# Patient Record
Sex: MALE | Race: WHITE | NOT HISPANIC OR LATINO | Employment: OTHER | URBAN - METROPOLITAN AREA
[De-identification: names, ages, dates, MRNs, and addresses within clinical notes are randomized per-mention and may not be internally consistent; named-entity substitution may affect disease eponyms.]

---

## 2019-07-05 ENCOUNTER — OFFICE VISIT (OUTPATIENT)
Dept: OBGYN CLINIC | Facility: CLINIC | Age: 64
End: 2019-07-05
Payer: COMMERCIAL

## 2019-07-05 VITALS
HEART RATE: 111 BPM | HEIGHT: 67 IN | WEIGHT: 202 LBS | DIASTOLIC BLOOD PRESSURE: 73 MMHG | SYSTOLIC BLOOD PRESSURE: 109 MMHG | BODY MASS INDEX: 31.71 KG/M2

## 2019-07-05 DIAGNOSIS — M65.331 TRIGGER MIDDLE FINGER OF RIGHT HAND: Primary | ICD-10-CM

## 2019-07-05 PROCEDURE — 99203 OFFICE O/P NEW LOW 30 MIN: CPT | Performed by: ORTHOPAEDIC SURGERY

## 2019-07-05 RX ORDER — CEFAZOLIN SODIUM 2 G/50ML
2000 SOLUTION INTRAVENOUS ONCE
Status: CANCELLED | OUTPATIENT
Start: 2019-07-31 | End: 2019-07-05

## 2019-07-05 NOTE — H&P (VIEW-ONLY)
Assessment/Plan:  1  Trigger middle finger of right hand         Scribe Attestation    I,:   Kat Gutierrez MA am acting as a scribe while in the presence of the attending physician :        I,:   Dahlia Bose DO personally performed the services described in this documentation    as scribed in my presence :              I discussed with Sveta Olivo today that his signs and symptoms are consistent with right long finger trigger finger  He is tender to palpation over the A1 pulley  There is obvious trigger on exam  He does have a mild flexion contracture of the PIP of approximately 15 degrees  Surgical intervention in the form of right long finger trigger finger release was discussed  He was agreeable to this  Risk of the surgery are inclusive of but not limited to bleeding, infection, nerve injury, blood clot, worsening of symptoms, not achieving the anticipated results, persistent stiffness, weakness and the need for additional surgery  Sveta Olivo verbally stated he understood those risks and would like to proceed with the surgery  Surgical consent was signed in the office today  I will see him the day of surgery  He understands he may need physical therapy after surgery because of his PIP flexion contracture  Subjective:   Ashok Barrientos is a 59 y o  male who presents to the office today for evaluation of right long finger triggering  Patient states this has been ongoing for several months  He states it does lock down and he will have to use his opposite hand to unlock it  He notes pain to the area of the A1 pulley  Patient does have a history of right ring finger trigger release performed in the past  He denies any numbness or tingling  Review of Systems   Constitutional: Negative for chills and fever  HENT: Negative for drooling and sneezing  Eyes: Negative for redness  Respiratory: Negative for cough and wheezing  Gastrointestinal: Negative for nausea and vomiting     Musculoskeletal: Negative for arthralgias, joint swelling and myalgias  Neurological: Negative for weakness and numbness  Psychiatric/Behavioral: Negative for behavioral problems  The patient is not nervous/anxious  History reviewed  No pertinent past medical history  Past Surgical History:   Procedure Laterality Date    TRIGGER FINGER RELEASE Right     Ring Finger        Family History   Problem Relation Age of Onset    No Known Problems Mother     No Known Problems Father     No Known Problems Sister     No Known Problems Brother     No Known Problems Maternal Aunt     No Known Problems Maternal Uncle     No Known Problems Paternal Aunt     No Known Problems Paternal Uncle     No Known Problems Maternal Grandmother     No Known Problems Maternal Grandfather     No Known Problems Paternal Grandmother     No Known Problems Paternal Grandfather        Social History     Occupational History    Not on file   Tobacco Use    Smoking status: Current Every Day Smoker    Smokeless tobacco: Never Used   Substance and Sexual Activity    Alcohol use: Never     Frequency: Never    Drug use: Never    Sexual activity: Not on file       No current outpatient medications on file  No Known Allergies    Objective:  Vitals:    07/05/19 0908   BP: 109/73   Pulse: (!) 111       Ortho Exam     Right long finger    TTP A1 pulley  Obvious triggering   Mild PIP flex contracture 15 degrees  Full fist   Compartments soft  Brisk capillary refill  Sensation intact median, radial, and ulnar nerve     Physical Exam   Constitutional: He is oriented to person, place, and time  He appears well-developed and well-nourished  HENT:   Head: Normocephalic and atraumatic  Eyes: Conjunctivae are normal  Right eye exhibits no discharge  Left eye exhibits no discharge  Neck: Normal range of motion  Neck supple  Cardiovascular: Normal rate and intact distal pulses  Pulmonary/Chest: Effort normal  No respiratory distress     Musculoskeletal: As noted in HPI   Neurological: He is alert and oriented to person, place, and time  Skin: Skin is warm and dry  Psychiatric: He has a normal mood and affect   His behavior is normal  Judgment and thought content normal

## 2019-07-05 NOTE — PROGRESS NOTES
Assessment/Plan:  1  Trigger middle finger of right hand         Scribe Attestation    I,:   Kat Gutierrez MA am acting as a scribe while in the presence of the attending physician :        I,:   Dago Rene DO personally performed the services described in this documentation    as scribed in my presence :              I discussed with Tessy Simmons today that his signs and symptoms are consistent with right long finger trigger finger  He is tender to palpation over the A1 pulley  There is obvious trigger on exam  He does have a mild flexion contracture of the PIP of approximately 15 degrees  Surgical intervention in the form of right long finger trigger finger release was discussed  He was agreeable to this  Risk of the surgery are inclusive of but not limited to bleeding, infection, nerve injury, blood clot, worsening of symptoms, not achieving the anticipated results, persistent stiffness, weakness and the need for additional surgery  Tessy Simmons verbally stated he understood those risks and would like to proceed with the surgery  Surgical consent was signed in the office today  I will see him the day of surgery  He understands he may need physical therapy after surgery because of his PIP flexion contracture  Subjective:   Jaime Mendez is a 59 y o  male who presents to the office today for evaluation of right long finger triggering  Patient states this has been ongoing for several months  He states it does lock down and he will have to use his opposite hand to unlock it  He notes pain to the area of the A1 pulley  Patient does have a history of right ring finger trigger release performed in the past  He denies any numbness or tingling  Review of Systems   Constitutional: Negative for chills and fever  HENT: Negative for drooling and sneezing  Eyes: Negative for redness  Respiratory: Negative for cough and wheezing  Gastrointestinal: Negative for nausea and vomiting     Musculoskeletal: Negative for arthralgias, joint swelling and myalgias  Neurological: Negative for weakness and numbness  Psychiatric/Behavioral: Negative for behavioral problems  The patient is not nervous/anxious  History reviewed  No pertinent past medical history  Past Surgical History:   Procedure Laterality Date    TRIGGER FINGER RELEASE Right     Ring Finger        Family History   Problem Relation Age of Onset    No Known Problems Mother     No Known Problems Father     No Known Problems Sister     No Known Problems Brother     No Known Problems Maternal Aunt     No Known Problems Maternal Uncle     No Known Problems Paternal Aunt     No Known Problems Paternal Uncle     No Known Problems Maternal Grandmother     No Known Problems Maternal Grandfather     No Known Problems Paternal Grandmother     No Known Problems Paternal Grandfather        Social History     Occupational History    Not on file   Tobacco Use    Smoking status: Current Every Day Smoker    Smokeless tobacco: Never Used   Substance and Sexual Activity    Alcohol use: Never     Frequency: Never    Drug use: Never    Sexual activity: Not on file       No current outpatient medications on file  No Known Allergies    Objective:  Vitals:    07/05/19 0908   BP: 109/73   Pulse: (!) 111       Ortho Exam     Right long finger    TTP A1 pulley  Obvious triggering   Mild PIP flex contracture 15 degrees  Full fist   Compartments soft  Brisk capillary refill  Sensation intact median, radial, and ulnar nerve     Physical Exam   Constitutional: He is oriented to person, place, and time  He appears well-developed and well-nourished  HENT:   Head: Normocephalic and atraumatic  Eyes: Conjunctivae are normal  Right eye exhibits no discharge  Left eye exhibits no discharge  Neck: Normal range of motion  Neck supple  Cardiovascular: Normal rate and intact distal pulses  Pulmonary/Chest: Effort normal  No respiratory distress     Musculoskeletal: As noted in HPI   Neurological: He is alert and oriented to person, place, and time  Skin: Skin is warm and dry  Psychiatric: He has a normal mood and affect   His behavior is normal  Judgment and thought content normal

## 2019-07-08 ENCOUNTER — TELEPHONE (OUTPATIENT)
Dept: FAMILY MEDICINE CLINIC | Facility: CLINIC | Age: 64
End: 2019-07-08

## 2019-07-08 NOTE — TELEPHONE ENCOUNTER
Marissa- surgical coordinator for Dr Vinay Fishman office called to make an appt for pt to get a surgical clearance done for a 7/17/19 surgery  PATs are being done 7/9/19  S poke with pt, he is completely brand new  Has not had a PCP in a long time  He has gone [facility] off in [address]  Phone Number [phone], for more acute issues  Called that office, he has not been seen since 2016   Appt is scheduled for 7/12/19

## 2019-07-09 ENCOUNTER — APPOINTMENT (OUTPATIENT)
Dept: LAB | Facility: CLINIC | Age: 64
End: 2019-07-09
Payer: COMMERCIAL

## 2019-07-09 DIAGNOSIS — M65.331 TRIGGER MIDDLE FINGER OF RIGHT HAND: ICD-10-CM

## 2019-07-09 LAB
ANION GAP SERPL CALCULATED.3IONS-SCNC: 6 MMOL/L (ref 4–13)
APTT PPP: 29 SECONDS (ref 23–37)
BASOPHILS # BLD AUTO: 0.05 THOUSANDS/ΜL (ref 0–0.1)
BASOPHILS NFR BLD AUTO: 1 % (ref 0–1)
BUN SERPL-MCNC: 17 MG/DL (ref 5–25)
CALCIUM SERPL-MCNC: 8.9 MG/DL (ref 8.3–10.1)
CHLORIDE SERPL-SCNC: 107 MMOL/L (ref 100–108)
CO2 SERPL-SCNC: 25 MMOL/L (ref 21–32)
CREAT SERPL-MCNC: 0.91 MG/DL (ref 0.6–1.3)
EOSINOPHIL # BLD AUTO: 0.19 THOUSAND/ΜL (ref 0–0.61)
EOSINOPHIL NFR BLD AUTO: 3 % (ref 0–6)
ERYTHROCYTE [DISTWIDTH] IN BLOOD BY AUTOMATED COUNT: 12.6 % (ref 11.6–15.1)
GFR SERPL CREATININE-BSD FRML MDRD: 90 ML/MIN/1.73SQ M
GLUCOSE SERPL-MCNC: 192 MG/DL (ref 65–140)
HCT VFR BLD AUTO: 45.7 % (ref 36.5–49.3)
HGB BLD-MCNC: 15.3 G/DL (ref 12–17)
IMM GRANULOCYTES # BLD AUTO: 0.03 THOUSAND/UL (ref 0–0.2)
IMM GRANULOCYTES NFR BLD AUTO: 0 % (ref 0–2)
INR PPP: 1.02 (ref 0.84–1.19)
LYMPHOCYTES # BLD AUTO: 1.55 THOUSANDS/ΜL (ref 0.6–4.47)
LYMPHOCYTES NFR BLD AUTO: 23 % (ref 14–44)
MCH RBC QN AUTO: 32.1 PG (ref 26.8–34.3)
MCHC RBC AUTO-ENTMCNC: 33.5 G/DL (ref 31.4–37.4)
MCV RBC AUTO: 96 FL (ref 82–98)
MONOCYTES # BLD AUTO: 0.55 THOUSAND/ΜL (ref 0.17–1.22)
MONOCYTES NFR BLD AUTO: 8 % (ref 4–12)
NEUTROPHILS # BLD AUTO: 4.49 THOUSANDS/ΜL (ref 1.85–7.62)
NEUTS SEG NFR BLD AUTO: 65 % (ref 43–75)
NRBC BLD AUTO-RTO: 0 /100 WBCS
PLATELET # BLD AUTO: 160 THOUSANDS/UL (ref 149–390)
PMV BLD AUTO: 12.9 FL (ref 8.9–12.7)
POTASSIUM SERPL-SCNC: 3.7 MMOL/L (ref 3.5–5.3)
PROTHROMBIN TIME: 13 SECONDS (ref 11.6–14.5)
RBC # BLD AUTO: 4.77 MILLION/UL (ref 3.88–5.62)
SODIUM SERPL-SCNC: 138 MMOL/L (ref 136–145)
WBC # BLD AUTO: 6.86 THOUSAND/UL (ref 4.31–10.16)

## 2019-07-09 PROCEDURE — 36415 COLL VENOUS BLD VENIPUNCTURE: CPT

## 2019-07-09 PROCEDURE — 85025 COMPLETE CBC W/AUTO DIFF WBC: CPT

## 2019-07-09 PROCEDURE — 80048 BASIC METABOLIC PNL TOTAL CA: CPT

## 2019-07-09 PROCEDURE — 85730 THROMBOPLASTIN TIME PARTIAL: CPT

## 2019-07-09 PROCEDURE — 85610 PROTHROMBIN TIME: CPT

## 2019-07-12 ENCOUNTER — OFFICE VISIT (OUTPATIENT)
Dept: FAMILY MEDICINE CLINIC | Facility: CLINIC | Age: 64
End: 2019-07-12
Payer: COMMERCIAL

## 2019-07-12 ENCOUNTER — TELEPHONE (OUTPATIENT)
Dept: FAMILY MEDICINE CLINIC | Facility: CLINIC | Age: 64
End: 2019-07-12

## 2019-07-12 ENCOUNTER — APPOINTMENT (OUTPATIENT)
Dept: PREADMISSION TESTING | Facility: HOSPITAL | Age: 64
End: 2019-07-12
Payer: COMMERCIAL

## 2019-07-12 ENCOUNTER — TELEPHONE (OUTPATIENT)
Dept: OBGYN CLINIC | Facility: CLINIC | Age: 64
End: 2019-07-12

## 2019-07-12 VITALS
HEIGHT: 67 IN | TEMPERATURE: 97.8 F | SYSTOLIC BLOOD PRESSURE: 130 MMHG | BODY MASS INDEX: 32.18 KG/M2 | OXYGEN SATURATION: 94 % | WEIGHT: 205 LBS | RESPIRATION RATE: 16 BRPM | HEART RATE: 72 BPM | DIASTOLIC BLOOD PRESSURE: 94 MMHG

## 2019-07-12 DIAGNOSIS — Z01.818 PRE-OP EVALUATION: Primary | ICD-10-CM

## 2019-07-12 PROCEDURE — 93000 ELECTROCARDIOGRAM COMPLETE: CPT | Performed by: NURSE PRACTITIONER

## 2019-07-12 PROCEDURE — 99204 OFFICE O/P NEW MOD 45 MIN: CPT | Performed by: NURSE PRACTITIONER

## 2019-07-12 RX ORDER — NAPROXEN SODIUM 220 MG
CAPSULE ORAL 2 TIMES DAILY PRN
COMMUNITY

## 2019-07-12 NOTE — TELEPHONE ENCOUNTER
I called Timothy Nunn for him to return my call to reschedule his surgical date  Currently scheduled for 7/17, however, due to cardiac clearance, would need to reschedule after 7/24

## 2019-07-12 NOTE — PRE-PROCEDURE INSTRUCTIONS
My Surgical Experience    The following information was developed to assist you to prepare for your operation  What do I need to do before coming to the hospital?   Arrange for a responsible person to drive you to and from the hospital    Arrange care for your children at home  Children are not allowed in the recovery areas of the hospital   Plan to wear clothing that is easy to put on and take off  If you are having shoulder surgery, wear a shirt that buttons or zippers in the front  Bathing  o Shower the evening before and the morning of your surgery with an antibacterial soap  Please refer to the Pre Op Showering Instructions for Surgery Patients Sheet   o Remove nail polish and all body piercing jewelry  o Do not shave any body part for at least 24 hours before surgery-this includes face, arms, legs and upper body  Food  o Nothing to eat or drink after midnight the night before your surgery  This includes candy and chewing gum  o Exception: If your surgery is after 12:00pm (noon), you may have clear liquids such as 7-Up®, ginger ale, apple or cranberry juice, Jell-O®, water, or clear broth until 8:00 am  o Do not drink milk or juice with pulp on the morning before surgery  o Do not drink alcohol 24 hours before surgery  Medicine  o Follow instructions you received from your surgeon about which medicines you may take on the day of surgery  o If instructed to take medicine on the morning of surgery, take pills with just a small sip of water  Call your prescribing doctor for specific infroamtion on what to do if you take insulin    What should I bring to the hospital?    Bring:  Sadia Drain or a walker, if you have them, for foot or knee surgery   A list of the daily medicines, vitamins, minerals, herbals and nutritional supplements you take   Include the dosages of medicines and the time you take them each day   Glasses, dentures or hearing aids   Minimal clothing; you will be wearing hospital sleepwear   Photo ID; required to verify your identity   If you have a Living Will or Power of , bring a copy of the documents   If you have an ostomy, bring an extra pouch and any supplies you use    Do not bring   Medicines or inhalers   Money, valuables or jewelry    What other information should I know about the day of surgery?  Notify your surgeons if you develop a cold, sore throat, cough, fever, rash or any other illness   Report to the Ambulatory Surgical/Same Day Surgery Unit   You will be instructed to stop at Registration only if you have not been pre-registered   Inform your  fi they do not stay that they will be asked by the staff to leave a phone number where they can be reached   Be available to be reached before surgery  In the event the operating room schedule changes, you may be asked to come in earlier or later than expected    *It is important to tell your doctor and others involved in your health care if you are taking or have been taking any non-prescription drugs, vitamins, minerals, herbals or other nutritional supplements  Any of these may interact with some food or medicines and cause a reaction      Pre-Surgery Instructions:   Medication Instructions    Naproxen Sodium (ALEVE) 220 MG CAPS Instructed patient per Anesthesia Guidelines

## 2019-07-12 NOTE — PROGRESS NOTES
INTERNAL MEDICINE PRE-OPERATIVE EVALUATION  [facility] - [facility]    NAME: Carter Lundberg  AGE: 59 y o  SEX: male  : 1955     DATE: 2019     Internal Medicine Pre-Operative Evaluation:     Chief Complaint: Pre-operative Evaluation     Surgery: Release trigger finger , middle finger, right hand  Anticipated Date of Surgery: 2019  Referring Provider: Self, Referral        History of Present Illness:     Carter Lundberg is a 59 y o  male who presents to the office today for a preoperative consultation at the request of surgeon, Karla William, who plans on performing release trigger finger right hand on 2019  Planned anesthesia is regional  Patient has a bleeding risk of: no recent abnormal bleeding  Patient does not have objections to receiving blood products if needed  Current anti-platelet/anti-coagulation medications that the patient is prescribed includes: None  Assessment of Chronic Conditions:   - None     Assessment of Cardiac Risk:  · Denies unstable or severe angina or MI in the last 6 weeks or history of stent placement in the last year   · Denies decompensated heart failure (e g  New onset heart failure, NYHA functional class IV heart failure, or worsening existing heart failure)  · Denies significant arrhythmias such as high grade AV block, symptomatic ventricular arrhythmia, newly recognized ventricular tachycardia, supraventricular tachycardia with resting heart rate >100, or symptomatic bradycardia  · Denies severe heart valve disease including aortic stenosis or symptomatic mitral stenosis     Exercise Capacity:  · Able to walk 4 blocks without symptoms?: Yes  · Able to walk 2 flights without symptoms?: Yes    Prior Anesthesia Reactions: No     Personal history of venous thromboembolic disease? No    History of steroid use for >2 weeks within last year?  No    STOP-BANG Sleep Apnea Screening Questionnaire:      Do you SNORE loudly (louder than talking or loud enough to be heard through closed doors)? No = 0 point   Do you often feel TIRED, fatigued, or sleepy during daytime? No = 0 point   Has anyone OBSERVED you stop breathing during your sleep? No = 0 point   Do you have or are you being treated for high blood pressure? No = 0 point   BMI more than 35 kg/m2? No = 0 point   AGE over 48years old? Yes = 1 point   NECK circumference > 16 inches (40 cm)? No = 0 point   Male GENDER? Yes = 1 point   TOTAL SCORE 2 = LOW risk of NELA       Review of Systems:     Review of Systems   Constitutional: Negative for fatigue and fever  HENT: Negative for congestion  Eyes: Negative for visual disturbance  Respiratory: Negative for chest tightness and shortness of breath  Cardiovascular: Negative for chest pain, palpitations and leg swelling  Gastrointestinal: Negative for abdominal pain, diarrhea, nausea and vomiting  Endocrine: Negative for cold intolerance, heat intolerance, polydipsia, polyphagia and polyuria  Genitourinary: Negative for dysuria, frequency and urgency  Musculoskeletal:        Pain and locking right middle finger  Skin: Negative for rash and wound  Allergic/Immunologic: Negative for immunocompromised state  Neurological: Negative for dizziness and headaches  Hematological: Does not bruise/bleed easily  All other systems reviewed and are negative  Problem List:     Patient Active Problem List   Diagnosis    Trigger middle finger of right hand        Allergies:     No Known Allergies     Current Medications:     No current outpatient medications on file  Past History:     No past medical history on file       Past Surgical History:   Procedure Laterality Date    TRIGGER FINGER RELEASE Right     Ring Finger         Family History   Problem Relation Age of Onset    No Known Problems Mother     No Known Problems Father     No Known Problems Sister     No Known Problems Brother     No Known Problems Maternal Aunt     No Known Problems Maternal Uncle     No Known Problems Paternal Aunt     No Known Problems Paternal Uncle     No Known Problems Maternal Grandmother     No Known Problems Maternal Grandfather     No Known Problems Paternal Grandmother     No Known Problems Paternal Grandfather         Social History     Socioeconomic History    Marital status:      Spouse name: Not on file    Number of children: Not on file    Years of education: Not on file    Highest education level: Not on file   Occupational History    Not on file   Social Needs    Financial resource strain: Not on file    Food insecurity:     Worry: Not on file     Inability: Not on file    Transportation needs:     Medical: Not on file     Non-medical: Not on file   Tobacco Use    Smoking status: Current Every Day Smoker    Smokeless tobacco: Never Used   Substance and Sexual Activity    Alcohol use: Never     Frequency: Never    Drug use: Never    Sexual activity: Not on file   Lifestyle    Physical activity:     Days per week: Not on file     Minutes per session: Not on file    Stress: Not on file   Relationships    Social connections:     Talks on phone: Not on file     Gets together: Not on file     Attends [employer] service: Not on file     Active member of club or organization: Not on file     Attends meetings of clubs or organizations: Not on file     Relationship status: Not on file    Intimate partner violence:     Fear of current or ex partner: Not on file     Emotionally abused: Not on file     Physically abused: Not on file     Forced sexual activity: Not on file   Other Topics Concern    Not on file   Social History Narrative    Not on file        Physical Exam:      /94 (BP Location: Right arm, Patient Position: Sitting, Cuff Size: Large)   Pulse 72   Temp 97 8 °F (36 6 °C) (Temporal)   Resp 16   Ht 5' 7" (1 702 m)   Wt 93 kg (205 lb)   SpO2 94%   BMI 32 11 kg/m²     Physical Exam Constitutional: He is oriented to person, place, and time  He appears well-developed and well-nourished  No distress  HENT:   Head: Normocephalic and atraumatic  Mouth/Throat: Oropharynx is clear and moist    Eyes: Pupils are equal, round, and reactive to light  EOM are normal    Neck: Normal range of motion  Neck supple  Cardiovascular: Normal rate and regular rhythm  No JVD  No audible carotid bruit  No peripheral edema  EKG NSR , incomplete right bundle branch, occ PVC- no priors to compare   Pulmonary/Chest: Effort normal and breath sounds normal  No respiratory distress  He has no wheezes  He has no rales  Abdominal: Soft  Bowel sounds are normal  He exhibits no distension  There is no tenderness  Musculoskeletal: Normal range of motion  He exhibits no edema, tenderness or deformity  Neurological: He is alert and oriented to person, place, and time  No cranial nerve deficit  Coordination normal    Skin: Skin is warm and dry  No rash noted  He is not diaphoretic  No erythema  Psychiatric: He has a normal mood and affect  His behavior is normal  Judgment and thought content normal    Vitals reviewed  Data:     Pre-operative work-up    Laboratory Results: I have personally reviewed the pertinent laboratory results/reports     EKG: I have personally reviewed pertinent reports  NSR, occ PVC, incomplete right bundle branch block  (pt reports believes EKG was abnormal in past and had "normal stress test", states "many years ago")  Previous cardiopulmonary studies within the past year: None           Assessment:       BMI Counseling: Body mass index is 32 11 kg/m²  Discussed the patient's BMI with him  The BMI is above average  BMI counseling and education was provided to the patient  Nutrition recommendations include reducing portion sizes, decreasing overall calorie intake and moderation in carbohydrate intake  Exercise recommendations include exercising 3-5 times per week         Plan: 59 y o  male with planned surgery: release trigger finger right hand, middle finger        1  Further preoperative workup as follows:   - cardiology eval  Further eval per cardio  2  Medication Management/Recommendations:   - Not applicable, not on any medications    3  Prophylaxis for cardiac events with perioperative beta-blockers: not indicated  4  Patient requires further consultation with: Cardiology: appt scheduled with Dr Albania Perez 7/24/19 at 1:30pm      Clearance  Pt is medically cleared for surgery pending cardiac clearance        [address]  [address] [facility] [employer] [address]  Phone#  [phone]  Fax#  [phone]

## 2019-07-12 NOTE — TELEPHONE ENCOUNTER
----- Message from Tiffanie Jefferson sent at 7/12/2019  2:25 PM EDT -----  Hi, patient needs cardiac clearance for his surgery   He is scheduled to see cardiologist 07/24/19

## 2019-07-12 NOTE — TELEPHONE ENCOUNTER
Left message for pt to cb      Cardiology can see pt on 7/24/19 at 1:30pm  Pt needs to be there at 1:15pm    [address], [facility] [address]  [address]

## 2019-07-15 NOTE — TELEPHONE ENCOUNTER
Carolynne Dancer returned my call, stated that 7/31 for surgery would be fine  He is aware that he requires the cardiac clearance for surgery

## 2019-07-24 ENCOUNTER — OFFICE VISIT (OUTPATIENT)
Dept: CARDIOLOGY CLINIC | Facility: CLINIC | Age: 64
End: 2019-07-24
Payer: COMMERCIAL

## 2019-07-24 VITALS
OXYGEN SATURATION: 95 % | DIASTOLIC BLOOD PRESSURE: 80 MMHG | BODY MASS INDEX: 31.8 KG/M2 | HEART RATE: 104 BPM | SYSTOLIC BLOOD PRESSURE: 120 MMHG | HEIGHT: 67 IN | WEIGHT: 202.6 LBS

## 2019-07-24 DIAGNOSIS — R06.00 EXERTIONAL DYSPNEA: ICD-10-CM

## 2019-07-24 DIAGNOSIS — Z87.891 SMOKING HX: ICD-10-CM

## 2019-07-24 DIAGNOSIS — R94.31 ABNORMAL EKG: ICD-10-CM

## 2019-07-24 DIAGNOSIS — Z01.810 PREOP CARDIOVASCULAR EXAM: Primary | ICD-10-CM

## 2019-07-24 PROCEDURE — 99244 OFF/OP CNSLTJ NEW/EST MOD 40: CPT | Performed by: INTERNAL MEDICINE

## 2019-07-24 PROCEDURE — 93000 ELECTROCARDIOGRAM COMPLETE: CPT | Performed by: INTERNAL MEDICINE

## 2019-07-24 RX ORDER — VARENICLINE TARTRATE 25 MG
KIT ORAL
Qty: 53 TABLET | Refills: 0 | Status: SHIPPED | OUTPATIENT
Start: 2019-07-24 | End: 2020-03-24 | Stop reason: ALTCHOICE

## 2019-07-24 RX ORDER — VARENICLINE TARTRATE 1 MG/1
1 TABLET, FILM COATED ORAL 2 TIMES DAILY
Qty: 60 TABLET | Refills: 6 | Status: SHIPPED | OUTPATIENT
Start: 2019-07-24 | End: 2019-09-24 | Stop reason: SDUPTHER

## 2019-07-24 NOTE — PROGRESS NOTES
Cardiology Outpatient Consultation                                                           Rasheeda Ramos  [id number]  1955      Consult for:Abnormal ekg  Appreciate consult by: ARACELI Blackwell    1  Preop cardiovascular exam  POCT ECG       Discussion/Summary:  Pre-op- Long-standing smoking hx  LAFB on ekg- rule out ischemia  Exercise treadmill stress to evaluate  If negative stress he will be intermediate risk for surgery    LAFB/RBBB- rule out ischemia  degenerative/related with breathing? Smoking hx - Discussion about smoking cessation  He would consider trial of chantix  He will pick quit date one month after starting  Continue for at least three months after  He will follow-up with his pcp      HPI:  58 yo hx long-standing smoking, no prior cardiac events presents for initial visitation  He has a hx of prior abnormal ekg  He reports active life-style  He denies having any prior history of myocardial infarction or CVA in the family  He denies having any prior history of atrial fibrillation  He denies having lower extremity edema  No history of sleep apnea  He reports a long history of smoking  He is willing to try quitting  He is concerned about his family history for lung cancer  He denies having PND orthopnea  He denies having easy bleeding or bruising  He denies having any prior adverse events to anesthesia          PMH  Stress test many years ago          Social Hx  Lives by himself  Frantz Peck- 2 to 3 hours  +Smoking- many years- 52 years  No alcohol  No snoring, no trouble breathing      Family Hx  No hx of MI              Past Medical History:   Diagnosis Date    Colon polyp     EKG abnormality     h/o " skipped beat"  had stress test ( several years ago) - was normal    Obesity      Social History     Socioeconomic History    Marital status:      Spouse name: Not on file    Number of children: Not on file    Years of education: Not on file    Highest education level: Not on file   Occupational History    Occupation: reitred   Social Needs    Financial resource strain: Not on file    Food insecurity:     Worry: Not on file     Inability: Not on file   ForceManager needs:     Medical: Not on file     Non-medical: Not on file   Tobacco Use    Smoking status: Current Every Day Smoker     Packs/day: 0 50     Years: 35 00     Pack years: 17 50     Types: Cigarettes    Smokeless tobacco: Never Used   Substance and Sexual Activity    Alcohol use: Never     Frequency: Never    Drug use: Never    Sexual activity: Not on file   Lifestyle    Physical activity:     Days per week: Not on file     Minutes per session: Not on file    Stress: Not on file   Relationships    Social connections:     Talks on phone: Not on file     Gets together: Not on file     Attends Nondenominational service: Not on file     Active member of club or organization: Not on file     Attends meetings of clubs or organizations: Not on file     Relationship status: Not on file    Intimate partner violence:     Fear of current or ex partner: Not on file     Emotionally abused: Not on file     Physically abused: Not on file     Forced sexual activity: Not on file   Other Topics Concern    Not on file   Social History Narrative    Not on file      Family History   Problem Relation Age of Onset    Cancer Mother     Lung cancer Father     No Known Problems Sister     No Known Problems Brother     No Known Problems Maternal Aunt     No Known Problems Maternal Uncle     No Known Problems Paternal Aunt     No Known Problems Paternal Uncle     No Known Problems Maternal Grandmother     No Known Problems Maternal Grandfather     No Known Problems Paternal Grandmother     No Known Problems Paternal Grandfather     Mental illness Neg Hx     Substance Abuse Neg Hx      Past Surgical History:   Procedure Laterality Date    COLON SURGERY  2009 divertiulitis    COLONOSCOPY      LUMBAR DISC SURGERY      TRIGGER FINGER RELEASE Right     Ring Finger        Current Outpatient Medications:     Naproxen Sodium (ALEVE) 220 MG CAPS, Take by mouth 2 (two) times a day as needed, Disp: , Rfl:   No Known Allergies  Vitals:    07/24/19 1324   BP: 120/80   BP Location: Right arm   Patient Position: Sitting   Cuff Size: Large   Pulse: 104   SpO2: 95%   Weight: 91 9 kg (202 lb 9 6 oz)   Height: 5' 7" (1 702 m)       Review of Systems:  Review of Systems   Constitutional: Negative  HENT: Negative  Eyes: Negative  Respiratory: Positive for shortness of breath  Cardiovascular: Negative  Gastrointestinal: Negative  Endocrine: Negative  Genitourinary: Negative  Musculoskeletal: Negative  Skin: Negative  Allergic/Immunologic: Negative  Neurological: Negative  Hematological: Negative  Psychiatric/Behavioral: Negative  Vitals:    07/24/19 1324   BP: 120/80   BP Location: Right arm   Patient Position: Sitting   Cuff Size: Large   Pulse: 104   SpO2: 95%   Weight: 91 9 kg (202 lb 9 6 oz)   Height: 5' 7" (1 702 m)     Physical Exam:  Physical Exam   Constitutional: He is oriented to person, place, and time  He appears well-developed and well-nourished  No distress  HENT:   Head: Normocephalic and atraumatic  Right Ear: External ear normal    Left Ear: External ear normal    Eyes: Pupils are equal, round, and reactive to light  Conjunctivae are normal  Right eye exhibits no discharge  Left eye exhibits no discharge  No scleral icterus  Neck: Normal range of motion  Neck supple  No JVD present  No tracheal deviation present  No thyromegaly present  Cardiovascular: Normal rate and regular rhythm  Exam reveals gallop  Exam reveals no friction rub  No murmur heard  Pulmonary/Chest: Effort normal and breath sounds normal  No stridor  No respiratory distress  He has no wheezes  He has no rales  He exhibits no tenderness  Abdominal: Soft  Bowel sounds are normal  He exhibits no distension and no mass  There is no tenderness  There is no rebound and no guarding  Musculoskeletal: Normal range of motion  He exhibits no edema, tenderness or deformity  Neurological: He is alert and oriented to person, place, and time  He has normal reflexes  He displays normal reflexes  No cranial nerve deficit  He exhibits normal muscle tone  Coordination normal    Skin: Skin is warm and dry  No rash noted  He is not diaphoretic  No erythema  No pallor  Psychiatric: He has a normal mood and affect  His behavior is normal  Judgment and thought content normal        Labs:     Lab Results   Component Value Date    WBC 6 86 07/09/2019    HGB 15 3 07/09/2019    HCT 45 7 07/09/2019    MCV 96 07/09/2019     07/09/2019     Lab Results   Component Value Date    K 3 7 07/09/2019     07/09/2019    CO2 25 07/09/2019    BUN 17 07/09/2019    CREATININE 0 91 07/09/2019    CALCIUM 8 9 07/09/2019    EGFR 90 07/09/2019     Imaging & Testing   I have personally reviewed pertinent reports  EKG: Personally reviewed  Sinus tachycardia incomplete RBBB + LAFB      Cardiac testing:   No results found for this or any previous visit  Kisha Villafana MD [facility] [phone]  Please call with any questions or suggestions    Counseling :  A description of the counseling:   Goals and Barriers:  Patient's ability to self care:  Medication side effect reviewed with patient in detail and all their questions answered  "This note has been constructed using a voice recognition system  Therefore there may be syntax, spelling, and/or grammatical errors   Please call if you have any questions  "

## 2019-07-26 ENCOUNTER — HOSPITAL ENCOUNTER (OUTPATIENT)
Dept: NON INVASIVE DIAGNOSTICS | Facility: CLINIC | Age: 64
Discharge: HOME | End: 2019-07-26
Payer: COMMERCIAL

## 2019-07-26 DIAGNOSIS — Z01.810 PREOP CARDIOVASCULAR EXAM: ICD-10-CM

## 2019-07-26 DIAGNOSIS — R94.31 ABNORMAL EKG: ICD-10-CM

## 2019-07-26 DIAGNOSIS — R06.00 EXERTIONAL DYSPNEA: ICD-10-CM

## 2019-07-26 LAB
CHEST PAIN STATEMENT: NORMAL
MAX DIASTOLIC BP: 94 MMHG
MAX HEART RATE: 150 BPM
MAX PREDICTED HEART RATE: 158 BPM
MAX. SYSTOLIC BP: 194 MMHG
PROTOCOL NAME: NORMAL
REASON FOR TERMINATION: NORMAL
TARGET HR FORMULA: NORMAL
TEST INDICATION: NORMAL
TIME IN EXERCISE PHASE: NORMAL

## 2019-07-26 PROCEDURE — 93017 CV STRESS TEST TRACING ONLY: CPT

## 2019-07-26 PROCEDURE — 93018 CV STRESS TEST I&R ONLY: CPT | Performed by: INTERNAL MEDICINE

## 2019-07-26 PROCEDURE — 93016 CV STRESS TEST SUPVJ ONLY: CPT | Performed by: INTERNAL MEDICINE

## 2019-07-29 ENCOUNTER — TELEPHONE (OUTPATIENT)
Dept: CARDIOLOGY CLINIC | Facility: CLINIC | Age: 64
End: 2019-07-29

## 2019-07-29 NOTE — TELEPHONE ENCOUNTER
----- Message from Evangelina Cornejo MD sent at 7/29/2019  9:05 AM EDT -----  Can we let him know stress test negative   Clear him for surgery

## 2019-07-29 NOTE — TELEPHONE ENCOUNTER
S/w pt made aware of results - Pt is cleared for RELEASE TRIGGER FINGER RLF (Right Hand) surgery on 7/31/19 - stress test was negative per Dr Geoff Hernandez

## 2019-07-30 ENCOUNTER — ANESTHESIA EVENT (OUTPATIENT)
Dept: PERIOP | Facility: HOSPITAL | Age: 64
End: 2019-07-30
Payer: COMMERCIAL

## 2019-07-31 ENCOUNTER — ANESTHESIA (OUTPATIENT)
Dept: PERIOP | Facility: HOSPITAL | Age: 64
End: 2019-07-31
Payer: COMMERCIAL

## 2019-07-31 ENCOUNTER — HOSPITAL ENCOUNTER (OUTPATIENT)
Facility: HOSPITAL | Age: 64
Setting detail: OUTPATIENT SURGERY
Discharge: HOME | End: 2019-07-31
Attending: ORTHOPAEDIC SURGERY | Admitting: ORTHOPAEDIC SURGERY
Payer: COMMERCIAL

## 2019-07-31 VITALS
WEIGHT: 204 LBS | TEMPERATURE: 97 F | OXYGEN SATURATION: 94 % | HEIGHT: 67 IN | BODY MASS INDEX: 32.02 KG/M2 | DIASTOLIC BLOOD PRESSURE: 90 MMHG | SYSTOLIC BLOOD PRESSURE: 140 MMHG | RESPIRATION RATE: 18 BRPM | HEART RATE: 83 BPM

## 2019-07-31 PROCEDURE — 26055 INCISE FINGER TENDON SHEATH: CPT | Performed by: ORTHOPAEDIC SURGERY

## 2019-07-31 RX ORDER — PROPOFOL 10 MG/ML
INJECTION, EMULSION INTRAVENOUS CONTINUOUS PRN
Status: DISCONTINUED | OUTPATIENT
Start: 2019-07-31 | End: 2019-07-31 | Stop reason: SURG

## 2019-07-31 RX ORDER — SODIUM CHLORIDE, SODIUM LACTATE, POTASSIUM CHLORIDE, CALCIUM CHLORIDE 600; 310; 30; 20 MG/100ML; MG/100ML; MG/100ML; MG/100ML
INJECTION, SOLUTION INTRAVENOUS CONTINUOUS PRN
Status: DISCONTINUED | OUTPATIENT
Start: 2019-07-31 | End: 2019-07-31 | Stop reason: SURG

## 2019-07-31 RX ORDER — CEFAZOLIN SODIUM 2 G/50ML
2000 SOLUTION INTRAVENOUS ONCE
Status: COMPLETED | OUTPATIENT
Start: 2019-07-31 | End: 2019-07-31

## 2019-07-31 RX ORDER — ONDANSETRON 2 MG/ML
4 INJECTION INTRAMUSCULAR; INTRAVENOUS ONCE AS NEEDED
Status: DISCONTINUED | OUTPATIENT
Start: 2019-07-31 | End: 2019-07-31 | Stop reason: HOSPADM

## 2019-07-31 RX ORDER — PROPOFOL 10 MG/ML
INJECTION, EMULSION INTRAVENOUS AS NEEDED
Status: DISCONTINUED | OUTPATIENT
Start: 2019-07-31 | End: 2019-07-31 | Stop reason: SURG

## 2019-07-31 RX ORDER — ALBUTEROL SULFATE 2.5 MG/3ML
2.5 SOLUTION RESPIRATORY (INHALATION) ONCE AS NEEDED
Status: COMPLETED | OUTPATIENT
Start: 2019-07-31 | End: 2019-07-31

## 2019-07-31 RX ORDER — FENTANYL CITRATE/PF 50 MCG/ML
25 SYRINGE (ML) INJECTION
Status: DISCONTINUED | OUTPATIENT
Start: 2019-07-31 | End: 2019-07-31 | Stop reason: HOSPADM

## 2019-07-31 RX ORDER — FENTANYL CITRATE 50 UG/ML
INJECTION, SOLUTION INTRAMUSCULAR; INTRAVENOUS AS NEEDED
Status: DISCONTINUED | OUTPATIENT
Start: 2019-07-31 | End: 2019-07-31 | Stop reason: SURG

## 2019-07-31 RX ORDER — MAGNESIUM HYDROXIDE 1200 MG/15ML
LIQUID ORAL AS NEEDED
Status: DISCONTINUED | OUTPATIENT
Start: 2019-07-31 | End: 2019-07-31 | Stop reason: HOSPADM

## 2019-07-31 RX ORDER — MIDAZOLAM HYDROCHLORIDE 1 MG/ML
INJECTION INTRAMUSCULAR; INTRAVENOUS AS NEEDED
Status: DISCONTINUED | OUTPATIENT
Start: 2019-07-31 | End: 2019-07-31 | Stop reason: SURG

## 2019-07-31 RX ADMIN — CEFAZOLIN SODIUM 2000 MG: 2 SOLUTION INTRAVENOUS at 12:33

## 2019-07-31 RX ADMIN — FENTANYL CITRATE 50 MCG: 50 INJECTION, SOLUTION INTRAMUSCULAR; INTRAVENOUS at 12:51

## 2019-07-31 RX ADMIN — SODIUM CHLORIDE, SODIUM LACTATE, POTASSIUM CHLORIDE, AND CALCIUM CHLORIDE: .6; .31; .03; .02 INJECTION, SOLUTION INTRAVENOUS at 11:50

## 2019-07-31 RX ADMIN — ALBUTEROL SULFATE 2.5 MG: 2.5 SOLUTION RESPIRATORY (INHALATION) at 13:54

## 2019-07-31 RX ADMIN — FENTANYL CITRATE 50 MCG: 50 INJECTION, SOLUTION INTRAMUSCULAR; INTRAVENOUS at 12:36

## 2019-07-31 RX ADMIN — MIDAZOLAM HYDROCHLORIDE 2 MG: 1 INJECTION, SOLUTION INTRAMUSCULAR; INTRAVENOUS at 12:36

## 2019-07-31 RX ADMIN — PROPOFOL 100 MCG/KG/MIN: 10 INJECTION, EMULSION INTRAVENOUS at 12:36

## 2019-07-31 RX ADMIN — PROPOFOL 50 MG: 10 INJECTION, EMULSION INTRAVENOUS at 12:36

## 2019-07-31 NOTE — ANESTHESIA PREPROCEDURE EVALUATION
Review of Systems/Medical History  Patient summary reviewed  Chart reviewed  No history of anesthetic complications     Cardiovascular  Exercise tolerance (METS): >4,     Pulmonary  Smoker cigarette smoker  , Tobacco cessation counseling given Cumulative Pack Years: 25,        GI/Hepatic            Endo/Other     GYN       Hematology   Musculoskeletal       Neurology   Psychology           Physical Exam    Airway    Mallampati score: II  TM Distance: >3 FB  Neck ROM: full     Dental   No notable dental hx     Cardiovascular  Cardiovascular exam normal    Pulmonary  Pulmonary exam normal     Other Findings        Anesthesia Plan  ASA Score- 2     Anesthesia Type- IV sedation with anesthesia with ASA Monitors  Additional Monitors:   Airway Plan:         Plan Factors-    Induction-     Postoperative Plan-     Informed Consent- Anesthetic plan and risks discussed with patient  I personally reviewed this patient with the CRNA  Discussed and agreed on the Anesthesia Plan with the CRNA  Tabby Mcgee

## 2019-07-31 NOTE — DISCHARGE INSTRUCTIONS
Dr Giles Ace Operative Instructions  Trigger Finger Release    You have had surgery on your arm today, please read and follow the information below:  · Elevate your hand above your elbow during the next 24-48 hours to help with swelling  · Place your hand and arm over your head with motion at your shoulder three times a day  · Do not apply any cream/ointment/oil to your incisions including antibiotics  · Do not soak your hands in standing water (dishwater, tubs, Jacuzzi's, pools, etc ) until given permission (typically 2-3 weeks after injury)    Call the office if you notice any:  · Increased numbness or tingling of your hand or fingers that is not relieved with elevation  · Increasing pain that is not controlled with medication  · Difficulty chewing, breathing, swallowing  · Chest pains or shortness of breath  · Fever over 101 4 degrees  Bandage: Remove bandage after 3 days  Band Aid thereafter  Keep wound clean and dry at all times  Cover to shower  Motion: Move fingers into a fist 5 times a day, DO NOT move any splinted fingers  Weight bearing status: Avoid heavy lifting (>5 pounds) with the extremity that was operated on until follow up appointment  Normal activities of daily living are OK  Ice: No ice    Sling: No sling necessary  Pain medication:   Naproxen 220 mg two times a day   Tylenol Extended Release 650 mg every 8 hours     Follow-up Appointment: 10-14 days  Please call the office at [phone] if you have any questions or concerns regarding your post-operative care

## 2019-07-31 NOTE — ANESTHESIA POSTPROCEDURE EVALUATION
Post-Op Assessment Note    CV Status:  Stable  Pain Score: 0    Pain management: satisfactory to patient     Mental Status:  Alert   Hydration Status:  Stable   PONV Controlled:  Controlled   Airway Patency:  Patent   Post Op Vitals Reviewed: Yes      Staff: CRNA           /95 (07/31/19 1322)    Temp (!) 97 °F (36 1 °C) (07/31/19 1322)    Pulse     Resp 22 (07/31/19 1322)    SpO2   94

## 2019-07-31 NOTE — INTERVAL H&P NOTE
H&P reviewed  After examining the patient I find no changes in the patients condition since the H&P had been written    /90   Pulse 82   Temp 97 9 °F (36 6 °C) (Tympanic)   Resp 18   Ht 5' 7" (1 702 m)   Wt 92 5 kg (204 lb)   SpO2 98%   BMI 31 95 kg/m²

## 2019-08-01 NOTE — OP NOTE
OPERATIVE REPORT  PATIENT NAME: Kait Knox    :  1955  MRN: [id number]  Pt Location: [facility] OR ROOM 04    SURGERY DATE: 2019    Surgeon(s) and Role:     * Afshin Kevin DO - Primary    Preop Diagnosis:  Trigger middle finger of right hand [M65 331]    Post-Op Diagnosis Codes:     * Trigger middle finger of right hand [M65 331]    Procedure(s) (LRB):  RELEASE TRIGGER FINGER RLF (Right)    Specimen(s):  * No specimens in log *    Estimated Blood Loss:   Minimal    Drains:  * No LDAs found *    Anesthesia Type:   Choice    Operative Indications:  Trigger middle finger of right hand [M65 331]      Operative Findings: Thickened a1 pulley  Inflammatory synovial fluid within flexor tendon sheath    Complications:   None    Procedure and Technique:  Patient is a 71-year-old male presented to the office with triggering of his long finger his right hand  Patient is also starting and flexion contracture of the PIP approximately 10°  Surgical versus nonsurgical options were discussed  Patient elected to undergo release A1 pulley right long finger in the operating room  Risks and benefits were discussed  Consent forms were obtained  Patient understood the risks and benefits  The day of surgery patient identified by 1st and last name  The right long finger was marked  Patient with the anesthesia team they deemed that sedation plus local is most appropriate  Patient underwent IV sedation in the operating room without complication  Sterile prep and drape was then performed  A time-out was performed successfully  Everybody in the room was in agreement  Antibiotics had been given  I reviewed the consent form myself  The right upper extremity was exsanguinated Esmarch bandage and the Esmarch was used as a tourniquet  Attention was then turned to the volar aspect of the hand  A 1 5 cm incision was made over the A1 pulley of the long finger of the right hand    Dissection was carefully carried down through skin and subcutaneous tissue  All neurovascular structures were retracted using a blunt Heiss retractor  We then encountered the A1 pulley  The proximal border the A1 pulley was located  This was incised with a crescent knife  Incision was then carried distally within the A1 pulley to the distal end of the A1 pulley  The distal distal border of the A1 pulley was then released  Release of the A1 pulley revealed extensive amount of sterile inflammatory fluid  The flexor tendons were then evaluated using a Ragnell and there is no injury to the flexor tendons and no fraying  The fingers taken through range of motion there is no triggering  Attention was then turned to the proximal aspect of the A1 pulley where the palmar aponeurosis was  The palmar aponeurosis was also released with a crescent knife  After happy with our release of the A1 pulley the tourniquet was released and the wound was thoroughly irrigated with normal saline solution  All bleeding was stopped bipolar cautery direct pressure  The wound was then closed with 5 0 nylon suture  Patient tolerated the surgery well  He was transferred from the OR to PACU in stable condition     I was present for the entire procedure, A qualified resident physician was not available and A physician assistant was required during the procedure for retraction tissue handling,dissection and suturing    Patient Disposition:  PACU  and hemodynamically stable    SIGNATURE: Tyree Beth DO  DATE: July 31, 2019  TIME: 9:36 PM

## 2019-08-19 ENCOUNTER — OFFICE VISIT (OUTPATIENT)
Dept: OBGYN CLINIC | Facility: CLINIC | Age: 64
End: 2019-08-19

## 2019-08-19 VITALS
DIASTOLIC BLOOD PRESSURE: 94 MMHG | HEART RATE: 101 BPM | WEIGHT: 205.4 LBS | BODY MASS INDEX: 32.24 KG/M2 | HEIGHT: 67 IN | SYSTOLIC BLOOD PRESSURE: 135 MMHG

## 2019-08-19 DIAGNOSIS — Z98.890 S/P TRIGGER FINGER RELEASE: Primary | ICD-10-CM

## 2019-08-19 PROCEDURE — 99024 POSTOP FOLLOW-UP VISIT: CPT | Performed by: ORTHOPAEDIC SURGERY

## 2019-08-19 NOTE — PROGRESS NOTES
Assessment/Plan:  Patient ID: Lazaro Lund 62 y o  male   Surgery: Release Trigger Finger Rlf - Right  Date of Surgery: 7/31/2019    19 days s/p right long finger trigger finger release  Sutures were removed today and a bandage was applied  He may remove the Band-Aid tomorrow  He should avoid pushing up on surfaces for the next few weeks, otherwise no restrictions  It was discussed with Ivy Lalo today that due to the wheezing following surgery and using his chest/lung musclesit caused the chest pain  ROM exercises were shown in the office today, full hand, full claw, table top exercises and making a full fist, to be done aprox  5 times a day to reduce swelling and tenderness  Follow up as needed if symptoms worsen or fail to improve  Follow Up:  PRN    To Do Next Visit:  Re-evaluation       CHIEF COMPLAINT:  Chief Complaint   Patient presents with    Right Middle Finger - Post-op, Pain         SUBJECTIVE:  Lazaro Lund is a 62y o  year old male who presents for follow up after Release Trigger Finger Rlf - Right  Today patient has sourness surrounding his incision site  He notes experiencing chest pain/sorness following surgery  He states that he was wheezing  Ivy May bought chantix, but hasn't stared it yet          PHYSICAL EXAMINATION:  General: well developed and well nourished, alert, oriented times 3 and appears comfortable  Psychiatric: Normal    MUSCULOSKELETAL EXAMINATION:  Incision: Clean, dry, intact and suture(s) intact   Surgery Site: normal, no evidence of infection  and swelling present  Range of Motion: As expected and loose full composite fist possible   Neurovascular status: Neuro intact, good cap refill  Activity Restrictions: No restrictions  Done today: Sutures out      STUDIES REVIEWED:  No Studies to review      PROCEDURES PERFORMED:  Procedures  No Procedures performed today       Scribe Attestation    I,:   Arvella Sicard Ditmars am acting as a scribe while in the presence of the attending physician :        I,:   Sinai Carter DO personally performed the services described in this documentation    as scribed in my presence :

## 2019-09-11 ENCOUNTER — TELEPHONE (OUTPATIENT)
Dept: FAMILY MEDICINE CLINIC | Facility: CLINIC | Age: 64
End: 2019-09-11

## 2019-09-24 ENCOUNTER — OFFICE VISIT (OUTPATIENT)
Dept: FAMILY MEDICINE CLINIC | Facility: CLINIC | Age: 64
End: 2019-09-24
Payer: COMMERCIAL

## 2019-09-24 VITALS
DIASTOLIC BLOOD PRESSURE: 70 MMHG | OXYGEN SATURATION: 98 % | SYSTOLIC BLOOD PRESSURE: 132 MMHG | WEIGHT: 206.4 LBS | BODY MASS INDEX: 32.39 KG/M2 | HEART RATE: 85 BPM | TEMPERATURE: 97.4 F | RESPIRATION RATE: 12 BRPM | HEIGHT: 67 IN

## 2019-09-24 DIAGNOSIS — M54.42 ACUTE LEFT-SIDED LOW BACK PAIN WITH LEFT-SIDED SCIATICA: ICD-10-CM

## 2019-09-24 DIAGNOSIS — Z12.5 SCREENING FOR MALIGNANT NEOPLASM OF PROSTATE: ICD-10-CM

## 2019-09-24 DIAGNOSIS — Z72.0 TOBACCO ABUSE: ICD-10-CM

## 2019-09-24 DIAGNOSIS — Z00.00 ANNUAL PHYSICAL EXAM: Primary | ICD-10-CM

## 2019-09-24 DIAGNOSIS — Z23 NEED FOR DIPHTHERIA-TETANUS-PERTUSSIS (TDAP) VACCINE: ICD-10-CM

## 2019-09-24 DIAGNOSIS — Z11.59 NEED FOR HEPATITIS C SCREENING TEST: ICD-10-CM

## 2019-09-24 DIAGNOSIS — E66.9 OBESITY (BMI 30.0-34.9): ICD-10-CM

## 2019-09-24 PROBLEM — K63.5 COLON POLYP: Status: ACTIVE | Noted: 2019-09-24

## 2019-09-24 PROCEDURE — 3008F BODY MASS INDEX DOCD: CPT | Performed by: NURSE PRACTITIONER

## 2019-09-24 PROCEDURE — 36415 COLL VENOUS BLD VENIPUNCTURE: CPT | Performed by: NURSE PRACTITIONER

## 2019-09-24 PROCEDURE — 99214 OFFICE O/P EST MOD 30 MIN: CPT | Performed by: NURSE PRACTITIONER

## 2019-09-24 PROCEDURE — 90715 TDAP VACCINE 7 YRS/> IM: CPT

## 2019-09-24 PROCEDURE — 90471 IMMUNIZATION ADMIN: CPT

## 2019-09-24 RX ORDER — PREDNISONE 20 MG/1
20 TABLET ORAL 2 TIMES DAILY WITH MEALS
Qty: 14 TABLET | Refills: 0 | Status: SHIPPED | OUTPATIENT
Start: 2019-09-24 | End: 2019-10-01

## 2019-09-24 NOTE — PATIENT INSTRUCTIONS
Prednisone 20mg twice daily for one week with food  No over the counter NSAIDS (ie: Aleve, Advil, Motrin, etc) while taking Prednisone  Physical therapy  Alternate ice/heat, 20 minutes on/20 minutes off  Will check labs  Contact gastroenterology to schedule colonoscopy  Regular exercise  Weight loss

## 2019-09-24 NOTE — PROGRESS NOTES
FAMILY PRACTICE HEALTH MAINTENANCE OFFICE VISIT  [facility] - [facility]    NAME: Teresa Jang  AGE: 62 y o  SEX: male  : 1955     DATE: 2019    Assessment and Plan     1  Annual physical exam  Health maintenance discussed  Diet, exercise, weight management, stress management, etc    All questions addressed, answered, and pt verbalized understanding  Anticipatory guidance  - Hemoglobin A1C  - Lipid panel  - TSH, 3rd generation  - Vitamin D 25 hydroxy  - Comprehensive metabolic panel  - UA (URINE) with reflex to Microscopic  2  Obesity (BMI 30 0-34  9)  Weight loss  Exercise  - Hemoglobin A1C  - Lipid panel  - TSH, 3rd generation  - Comprehensive metabolic panel  3  Tobacco abuse  Smoking cessation counseline  - Comprehensive metabolic panel  - UA (URINE) with reflex to Microscopic  4  Need for hepatitis C screening test  - Hepatitis C antibody  5  Screening for malignant neoplasm of prostate  - PSA Total, Diagnostic  6  Acute left-sided low back pain with left-sided sciatica  - predniSONE 20 mg tablet; Take 1 tablet (20 mg total) by mouth 2 (two) times a day with meals for 7 days  Dispense: 14 tablet; Refill: 0  - Ambulatory referral to Physical Therapy; Future  Ice/heat  No nsaids while taking Prednisone  Pt verbalized understanding  7  Need for diphtheria-tetanus-pertussis (Tdap) vaccine  - TDAP VACCINE GREATER THAN OR EQUAL TO 8YO IM      Tobacco Cessation Counseling: Tobacco cessation counseling and education was provided  The patient is sincerely urged to quit consumption of tobacco  He is not ready to quit tobacco  The numerous health risks of tobacco consumption were discussed  If he decides to quit, there are a number of helpful adjunctive aids, and he can see me to discuss nicotine replacement therapy, chantix, or bupropion anytime in the future      · Patient Counseling:   · Nutrition: Stressed importance of a well balanced diet, moderation of sodium/saturated fat, caloric balance and sufficient intake of fiber  · Exercise: Stressed the importance of regular exercise with a goal of 150 minutes per week  · Dental Health: Discussed daily flossing and brushing and regular dental visits   · Alcohol Use:  Recommended moderation of alcohol intake  · Injury Prevention: Discussed Safety Belts, Safety Helmets, and Smoke Detectors    · Immunizations reviewed: See Orders  · Discussed benefits of:  Colon Cancer Screening, Prostate Cancer Screening  and Screening labs   BMI Counseling: Body mass index is 32 33 kg/m²  Discussed with patient's BMI with him  The BMI is above normal  Nutrition recommendations include reducing portion sizes, decreasing overall calorie intake, consuming healthier snacks, moderation in carbohydrate intake, reducing intake of saturated fat and trans fat and reducing intake of cholesterol  Exercise recommendations include exercising 3-5 times per week  Chief Complaint     Chief Complaint   Patient presents with    Annual Exam       History of Present Illness     Pt here for physical exam   Complaining of left lower back pain radiating down back of thigh x 10 days  Does not recall specific event that caused pain  Pain started when he woke up in the morning  He feels like he may have been sitting for a long period of time which may have caused it  Pain is much better today, but still has mild pain  Had numbness in back of leg for first 3 days  Now resolved  History of lumbar surgery L5-S1 , about 5 years ago  Pain improves when he moves around and walks  Also has been icing and taking Aleve  Became constipated when he started Aleve, so he is no longer taking it  Bowels back to normal   Pt still smoking 1/2-1 ppd  Has Chantix at home, but has not started it yet  He notices that he is short of breath with exertion  Does not need to stop activity  Denies lightheadedness or chest pain with SOB    Recently evaluated by cardiologist for preop clearance for trigger finger surgery  Had colonoscopy 5 years ago  Due for one now, but would rather do Cologuard at this time  Did have polyps removed with prior colonoscopy  Does not want flu shot  Will get TDAP today  Well Adult Physical   Patient here for a comprehensive physical exam       Diet and Physical Activity  Diet: well balanced diet  Exercise: infrequently      Depression Screen  Depression Screening Follow-up Plan: Patient's depression screening was positive with a PHQ-2 score of   Their PHQ-9 score was   Patient assessed for underlying major depression  They have no active suicidal ideations  Brief counseling provided and recommend additional follow-up/re-evaluation next office visit  General Health  Hearing: Normal:  bilateral  Vision: no vision problems  Dental: regular dental visits      The following portions of the patient's history were reviewed and updated as appropriate: allergies, current medications, past family history, past medical history, past social history, past surgical history and problem list     Review of Systems     Review of Systems   Constitutional: Negative for appetite change, chills, diaphoresis, fatigue and fever  HENT: Negative  Eyes: Negative  Respiratory: Positive for shortness of breath (Gets "winded" on exertion) and wheezing (At times)  Negative for cough  Cardiovascular: Negative for chest pain and palpitations  Gastrointestinal: Negative for abdominal pain, constipation, diarrhea, nausea and vomiting  Endocrine: Negative for cold intolerance and heat intolerance  Genitourinary: Negative  Musculoskeletal: Positive for back pain  Skin: Negative for color change and rash  Allergic/Immunologic: Negative  Neurological: Negative for dizziness, light-headedness and headaches  Psychiatric/Behavioral: Positive for dysphoric mood (Occasionally in AM and PM when he is alone  Is considering medications  )  The patient is not nervous/anxious          Past Medical History     Past Medical History:   Diagnosis Date    Colon polyp     EKG abnormality     h/o " skipped beat"  had stress test ( several years ago) - was normal    Obesity        Past Surgical History     Past Surgical History:   Procedure Laterality Date    COLON SURGERY  2009    divertiulitis    COLONOSCOPY      LUMBAR DISC SURGERY      [place] INCISE FINGER TENDON SHEATH Right 7/31/2019    Procedure: RELEASE TRIGGER FINGER RLF;  Surgeon: Marisol White DO;  Location: [facility];  Service: Orthopedics    TRIGGER FINGER RELEASE Right     Ring Finger        Social History     Social History     Socioeconomic History    Marital status:      Spouse name: None    Number of children: None    Years of education: None    Highest education level: None   Occupational History    Occupation: reitred   Social Needs    Financial resource strain: None    Food insecurity:     Worry: None     Inability: None    Transportation needs:     Medical: None     Non-medical: None   Tobacco Use    Smoking status: Current Every Day Smoker     Packs/day: 0 50     Years: 35 00     Pack years: 17 50     Types: Cigarettes    Smokeless tobacco: Never Used   Substance and Sexual Activity    Alcohol use: Never     Frequency: Never    Drug use: Never    Sexual activity: Not Currently   Lifestyle    Physical activity:     Days per week: None     Minutes per session: None    Stress: None   Relationships    Social connections:     Talks on phone: None     Gets together: None     Attends [employer] witness service: None     Active member of club or organization: None     Attends meetings of clubs or organizations: None     Relationship status: None    Intimate partner violence:     Fear of current or ex partner: None     Emotionally abused: None     Physically abused: None     Forced sexual activity: None   Other Topics Concern    None   Social History Narrative    None Family History     Family History   Problem Relation Age of Onset    Cancer Mother     Lung cancer Father     No Known Problems Sister     No Known Problems Brother     No Known Problems Maternal Aunt     No Known Problems Maternal Uncle     No Known Problems Paternal Aunt     No Known Problems Paternal Uncle     No Known Problems Maternal Grandmother     No Known Problems Maternal Grandfather     No Known Problems Paternal Grandmother     No Known Problems Paternal Grandfather     Mental illness Neg Hx     Substance Abuse Neg Hx        Current Medications       Current Outpatient Medications:     Naproxen Sodium (ALEVE) 220 MG CAPS, Take by mouth 2 (two) times a day as needed, Disp: , Rfl:     predniSONE 20 mg tablet, Take 1 tablet (20 mg total) by mouth 2 (two) times a day with meals for 7 days, Disp: 14 tablet, Rfl: 0    varenicline (CHANTIX SEAMUS) 0 5 MG X 11 & 1 MG X 42 tablet, Take one 0 5mg tab by mouth 1x daily for 3 days, then increase to one 0 5mg tab 2x daily for 3 days, then increase to one 1mg tab 2x daily, Disp: 53 tablet, Rfl: 0     Allergies     No Known Allergies    Objective     /70 (BP Location: Right arm, Patient Position: Sitting, Cuff Size: Standard)   Pulse 85   Temp (!) 97 4 °F (36 3 °C) (Temporal)   Resp 12   Ht 5' 7" (1 702 m)   Wt 93 6 kg (206 lb 6 4 oz)   SpO2 98%   BMI 32 33 kg/m²      Physical Exam   Constitutional: He is oriented to person, place, and time  He appears well-developed and well-nourished  HENT:   Right Ear: External ear normal    Left Ear: External ear normal    Mouth/Throat: Oropharynx is clear and moist    Eyes: Pupils are equal, round, and reactive to light  Conjunctivae and EOM are normal    Neck: Normal range of motion  Neck supple  Cardiovascular: Normal rate, regular rhythm and intact distal pulses  Murmur heard    Negative bilateral carotid bruits   Pulmonary/Chest: Effort normal and breath sounds normal  No respiratory distress  He has no wheezes  Abdominal: Soft  Bowel sounds are normal  There is no tenderness  Musculoskeletal: Normal range of motion  He exhibits no edema  Neurological: He is alert and oriented to person, place, and time  Skin: Skin is warm and dry  Capillary refill takes less than 2 seconds  Psychiatric: He has a normal mood and affect   His behavior is normal  Judgment and thought content normal           Visual Acuity Screening    Right eye Left eye Both eyes   Without correction: 20/30 20/50 20/20   With correction:              Arturo Escobar 83

## 2019-09-26 LAB
25(OH)D3+25(OH)D2 SERPL-MCNC: 25.3 NG/ML (ref 30–100)
ALBUMIN SERPL-MCNC: 4.7 G/DL (ref 3.6–4.8)
ALBUMIN/GLOB SERPL: 2.1 {RATIO} (ref 1.2–2.2)
ALP SERPL-CCNC: 32 IU/L (ref 39–117)
ALT SERPL-CCNC: 23 IU/L (ref 0–44)
APPEARANCE UR: CLEAR
AST SERPL-CCNC: 18 IU/L (ref 0–40)
BILIRUB SERPL-MCNC: 0.3 MG/DL (ref 0–1.2)
BILIRUB UR QL STRIP: NEGATIVE
BUN SERPL-MCNC: 18 MG/DL (ref 8–27)
BUN/CREAT SERPL: 17 (ref 10–24)
CALCIUM SERPL-MCNC: 9.2 MG/DL (ref 8.6–10.2)
CHLORIDE SERPL-SCNC: 106 MMOL/L (ref 96–106)
CHOLEST SERPL-MCNC: 178 MG/DL (ref 100–199)
CHOLEST/HDLC SERPL: 6.6 RATIO (ref 0–5)
CO2 SERPL-SCNC: 20 MMOL/L (ref 20–29)
COLOR UR: YELLOW
CREAT SERPL-MCNC: 1.05 MG/DL (ref 0.76–1.27)
EST. AVERAGE GLUCOSE BLD GHB EST-MCNC: 131 MG/DL
GLOBULIN SER-MCNC: 2.2 G/DL (ref 1.5–4.5)
GLUCOSE SERPL-MCNC: 96 MG/DL (ref 65–99)
GLUCOSE UR QL: NEGATIVE
HBA1C MFR BLD: 6.2 % (ref 4.8–5.6)
HCV AB S/CO SERPL IA: 0.2 S/CO RATIO (ref 0–0.9)
HDLC SERPL-MCNC: 27 MG/DL
HGB UR QL STRIP: NEGATIVE
KETONES UR QL STRIP: NEGATIVE
LDLC SERPL CALC-MCNC: 98 MG/DL (ref 0–99)
LDLC SERPL DIRECT ASSAY-MCNC: 106 MG/DL (ref 0–99)
LEUKOCYTE ESTERASE UR QL STRIP: NEGATIVE
MICRO URNS: NORMAL
NITRITE UR QL STRIP: NEGATIVE
PH UR STRIP: 5 [PH] (ref 5–7.5)
POTASSIUM SERPL-SCNC: 4.5 MMOL/L (ref 3.5–5.2)
PROT SERPL-MCNC: 6.9 G/DL (ref 6–8.5)
PROT UR QL STRIP: NEGATIVE
PSA SERPL-MCNC: 1.1 NG/ML (ref 0–4)
SL AMB EGFR AFRICAN AMERICAN: 87 ML/MIN/1.73
SL AMB EGFR NON AFRICAN AMERICAN: 75 ML/MIN/1.73
SL AMB VLDL CHOLESTEROL CALC: 53 MG/DL (ref 5–40)
SODIUM SERPL-SCNC: 141 MMOL/L (ref 134–144)
SP GR UR: 1.02 (ref 1–1.03)
TRIGL SERPL-MCNC: 265 MG/DL (ref 0–149)
TSH SERPL DL<=0.005 MIU/L-ACNC: 0.76 UIU/ML (ref 0.45–4.5)
UROBILINOGEN UR STRIP-ACNC: 0.2 MG/DL (ref 0.2–1)

## 2019-09-27 ENCOUNTER — TELEPHONE (OUTPATIENT)
Dept: FAMILY MEDICINE CLINIC | Facility: CLINIC | Age: 64
End: 2019-09-27

## 2019-09-27 DIAGNOSIS — Z12.11 SCREENING FOR MALIGNANT NEOPLASM OF COLON: Primary | ICD-10-CM

## 2019-09-27 NOTE — TELEPHONE ENCOUNTER
Pt stopped in to get a referral for a colonoscopy  Pt was given a card for Dr Rahul Louie  Please order

## 2020-03-24 ENCOUNTER — TELEMEDICINE (OUTPATIENT)
Dept: FAMILY MEDICINE CLINIC | Facility: CLINIC | Age: 65
End: 2020-03-24
Payer: COMMERCIAL

## 2020-03-24 DIAGNOSIS — E55.9 VITAMIN D DEFICIENCY: ICD-10-CM

## 2020-03-24 DIAGNOSIS — F41.8 SITUATIONAL ANXIETY: ICD-10-CM

## 2020-03-24 DIAGNOSIS — R73.03 PRE-DIABETES: Primary | ICD-10-CM

## 2020-03-24 DIAGNOSIS — G47.00 INSOMNIA, UNSPECIFIED TYPE: ICD-10-CM

## 2020-03-24 DIAGNOSIS — Z72.0 TOBACCO ABUSE: ICD-10-CM

## 2020-03-24 DIAGNOSIS — E78.1 HYPERTRIGLYCERIDEMIA: ICD-10-CM

## 2020-03-24 PROCEDURE — 4004F PT TOBACCO SCREEN RCVD TLK: CPT | Performed by: NURSE PRACTITIONER

## 2020-03-24 PROCEDURE — 99213 OFFICE O/P EST LOW 20 MIN: CPT | Performed by: NURSE PRACTITIONER

## 2020-03-24 NOTE — PROGRESS NOTES
Virtual Regular Visit    Problem List Items Addressed This Visit        Other    Tobacco abuse      Other Visit Diagnoses     Pre-diabetes    -  Primary    Hypertriglyceridemia        Vitamin D deficiency                   Reason for visit is follow up on chronic issues  Encounter provider ARACELI Burr    Provider located at [place] [address]  [address] [id number]      Recent Visits  No visits were found meeting these conditions  Showing recent visits within past 7 days and meeting all other requirements     Future Appointments  No visits were found meeting these conditions  Showing future appointments within next 150 days and meeting all other requirements        After connecting through [employer], the patient was identified by name and date of birth  Vimal Vazquez was informed that this is a telemedicine visit and that the visit is being conducted through telephone which may not be secure and therefore, might not be HIPAA-compliant  My office door was closed  No one else was in the room  He acknowledged consent and understanding of privacy and security of the video platform  The patient has agreed to participate and understands they can discontinue the visit at any time  Subjective  Vimal Vazquez is a 62 y o  male for telephonic visit to address chronic issues  Not on any prescription meds  Last labs done were in 9/19  At that time, was instructed on lifestyle changes  No recent illness  Feels well  Not sleeping well due to stress  Waking up after a few hours and cannot go back to sleep  [place] but got headache from it  Just feels very anxious and stressed due to current Covid 19 issues  Had been using chantix to stop smoking and was doing well and stopped taking it  Up to 1ppd again  Thinks smoking more again due to stress          Depression Screening Follow-up Plan: Patient's depression screening was negative with a PHQ-2 score of 0    Clinically patient does not have depression  No treatment is required         Past Medical History:   Diagnosis Date    Colon polyp     EKG abnormality     h/o " skipped beat"  had stress test ( several years ago) - was normal    Obesity        Past Surgical History:   Procedure Laterality Date    COLON SURGERY  2009    divertiulitis    COLONOSCOPY      LUMBAR DISC SURGERY      [place] INCISE FINGER TENDON SHEATH Right 7/31/2019    Procedure: RELEASE TRIGGER FINGER RLF;  Surgeon: Chelle Knott DO;  Location: [facility];  Service: Orthopedics    TRIGGER FINGER RELEASE Right     Ring Finger        Current Outpatient Medications   Medication Sig Dispense Refill    Naproxen Sodium (ALEVE) 220 MG CAPS Take by mouth 2 (two) times a day as needed       No current facility-administered medications for this visit  No Known Allergies    Review of Systems   Constitutional: Negative for fatigue, fever and unexpected weight change  HENT: Negative for congestion and sore throat  Respiratory: Negative for cough and shortness of breath  Cardiovascular: Negative for chest pain and palpitations  Gastrointestinal: Negative for diarrhea, nausea and vomiting  Endocrine: Negative for cold intolerance, heat intolerance, polydipsia, polyphagia and polyuria  Neurological: Negative for dizziness and headaches  Psychiatric/Behavioral: Positive for sleep disturbance  Negative for self-injury and suicidal ideas  The patient is nervous/anxious  1  Pre-diabetes  Carb controlled diet  Weight loss  Increase exercise  Will repeat labs   - Comprehensive metabolic panel; Future  - Hemoglobin A1C; Future  - Lipid panel; Future  - 2  Hypertriglyceridemia  Heart healthy diet  Weight loss  Regular exercise  - Comprehensive metabolic panel; Future  - Lipid panel; Future  -3  Vitamin D deficiency  - Comprehensive metabolic panel; Future  - Vitamin D 25 hydroxy; Future  -4   Tobacco abuse  Tobacco Cessation Counseling: Tobacco cessation counseling and education was provided  The patient is sincerely urged to quit consumption of tobacco  He is not ready to quit tobacco  The numerous health risks of tobacco consumption were discussed  If he decides to quit, there are a number of helpful adjunctive aids, and he can see me to discuss nicotine replacement therapy, chantix, or bupropion anytime in the future  5  Insomnia, unspecified type  try Melatonin 5mg at bedtime (over the counter)   Try to follow a regular sleep schedule  Limit stimulation in sleep quarters (such as putting cell phone on "do not disturb", no TV, no radio, etc  )  6  Situational anxiety  Stress management  Activities to divert attention when possible  Conscious breathing techniques as discussed  Coping mechanisms and strategies vary from person to person so try to utilize strategies that you think may work for you (such as meditation, music, etc  )  I spent 15 minutes with the patient during this visit

## 2020-03-24 NOTE — PATIENT INSTRUCTIONS
Heart healthy, carbohydrate controlled diet- limit red meat, limit saturated fat, moderate salt intake, limit junk food, low carbohydrate,etc    Regular exercise  Stress management  Activities to divert attention when possible  Conscious breathing techniques as discussed  Coping mechanisms and strategies vary from person to person so try to utilize strategies that you think may work for you (such as meditation, music, etc  )  For sleep, try Melatonin 5mg at bedtime (over the counter)   Try to follow a regular sleep schedule  Limit stimulation in sleep quarters (such as putting cell phone on "do not disturb", no TV, no radio, etc  )  Routine labwork and screenings as ordered

## 2020-06-23 ENCOUNTER — OFFICE VISIT (OUTPATIENT)
Dept: FAMILY MEDICINE CLINIC | Facility: CLINIC | Age: 65
End: 2020-06-23
Payer: COMMERCIAL

## 2020-06-23 VITALS
OXYGEN SATURATION: 96 % | WEIGHT: 202 LBS | DIASTOLIC BLOOD PRESSURE: 84 MMHG | TEMPERATURE: 97.7 F | RESPIRATION RATE: 16 BRPM | HEART RATE: 84 BPM | SYSTOLIC BLOOD PRESSURE: 130 MMHG | BODY MASS INDEX: 31.71 KG/M2 | HEIGHT: 67 IN

## 2020-06-23 DIAGNOSIS — E55.9 VITAMIN D DEFICIENCY: ICD-10-CM

## 2020-06-23 DIAGNOSIS — E78.1 HYPERTRIGLYCERIDEMIA: ICD-10-CM

## 2020-06-23 DIAGNOSIS — R73.03 PRE-DIABETES: ICD-10-CM

## 2020-06-23 DIAGNOSIS — Z00.00 ANNUAL PHYSICAL EXAM: Primary | ICD-10-CM

## 2020-06-23 DIAGNOSIS — Z11.4 SCREENING FOR HIV (HUMAN IMMUNODEFICIENCY VIRUS): ICD-10-CM

## 2020-06-23 PROCEDURE — 36415 COLL VENOUS BLD VENIPUNCTURE: CPT | Performed by: NURSE PRACTITIONER

## 2020-06-23 PROCEDURE — 99396 PREV VISIT EST AGE 40-64: CPT | Performed by: NURSE PRACTITIONER

## 2020-06-25 LAB
ALBUMIN SERPL-MCNC: 5 G/DL (ref 3.8–4.8)
ALBUMIN/GLOB SERPL: 2.3 {RATIO} (ref 1.2–2.2)
ALP SERPL-CCNC: 32 IU/L (ref 39–117)
ALT SERPL-CCNC: 25 IU/L (ref 0–44)
AST SERPL-CCNC: 21 IU/L (ref 0–40)
BASOPHILS # BLD AUTO: 0 X10E3/UL (ref 0–0.2)
BASOPHILS NFR BLD AUTO: 1 %
BILIRUB SERPL-MCNC: 0.7 MG/DL (ref 0–1.2)
BUN SERPL-MCNC: 16 MG/DL (ref 8–27)
BUN/CREAT SERPL: 18 (ref 10–24)
CALCIUM SERPL-MCNC: 9.7 MG/DL (ref 8.6–10.2)
CHLORIDE SERPL-SCNC: 103 MMOL/L (ref 96–106)
CHOLEST SERPL-MCNC: 181 MG/DL (ref 100–199)
CHOLEST/HDLC SERPL: 6.5 RATIO (ref 0–5)
CO2 SERPL-SCNC: 24 MMOL/L (ref 20–29)
CREAT SERPL-MCNC: 0.91 MG/DL (ref 0.76–1.27)
EOSINOPHIL # BLD AUTO: 0.1 X10E3/UL (ref 0–0.4)
EOSINOPHIL NFR BLD AUTO: 2 %
ERYTHROCYTE [DISTWIDTH] IN BLOOD BY AUTOMATED COUNT: 13.1 % (ref 11.6–15.4)
EST. AVERAGE GLUCOSE BLD GHB EST-MCNC: 131 MG/DL
GLOBULIN SER-MCNC: 2.2 G/DL (ref 1.5–4.5)
GLUCOSE SERPL-MCNC: 115 MG/DL (ref 65–99)
HBA1C MFR BLD: 6.2 % (ref 4.8–5.6)
HCT VFR BLD AUTO: 47.2 % (ref 37.5–51)
HDLC SERPL-MCNC: 28 MG/DL
HGB BLD-MCNC: 16.5 G/DL (ref 13–17.7)
HIV 1+2 AB+HIV1 P24 AG SERPL QL IA: NON REACTIVE
IMM GRANULOCYTES # BLD: 0 X10E3/UL (ref 0–0.1)
IMM GRANULOCYTES NFR BLD: 0 %
LDLC SERPL CALC-MCNC: 116 MG/DL (ref 0–99)
LYMPHOCYTES # BLD AUTO: 1.8 X10E3/UL (ref 0.7–3.1)
LYMPHOCYTES NFR BLD AUTO: 27 %
MCH RBC QN AUTO: 32.4 PG (ref 26.6–33)
MCHC RBC AUTO-ENTMCNC: 35 G/DL (ref 31.5–35.7)
MCV RBC AUTO: 93 FL (ref 79–97)
MONOCYTES # BLD AUTO: 0.7 X10E3/UL (ref 0.1–0.9)
MONOCYTES NFR BLD AUTO: 10 %
NEUTROPHILS # BLD AUTO: 3.9 X10E3/UL (ref 1.4–7)
NEUTROPHILS NFR BLD AUTO: 60 %
PLATELET # BLD AUTO: 209 X10E3/UL (ref 150–450)
POTASSIUM SERPL-SCNC: 4.5 MMOL/L (ref 3.5–5.2)
PROT SERPL-MCNC: 7.2 G/DL (ref 6–8.5)
RBC # BLD AUTO: 5.09 X10E6/UL (ref 4.14–5.8)
SL AMB EGFR AFRICAN AMERICAN: 103 ML/MIN/1.73
SL AMB EGFR NON AFRICAN AMERICAN: 89 ML/MIN/1.73
SL AMB VLDL CHOLESTEROL CALC: 37 MG/DL (ref 5–40)
SODIUM SERPL-SCNC: 142 MMOL/L (ref 134–144)
TRIGL SERPL-MCNC: 186 MG/DL (ref 0–149)
WBC # BLD AUTO: 6.5 X10E3/UL (ref 3.4–10.8)

## 2020-06-29 ENCOUNTER — TELEMEDICINE (OUTPATIENT)
Dept: FAMILY MEDICINE CLINIC | Facility: CLINIC | Age: 65
End: 2020-06-29
Payer: COMMERCIAL

## 2020-06-29 VITALS — WEIGHT: 201 LBS | HEIGHT: 67 IN | BODY MASS INDEX: 31.55 KG/M2

## 2020-06-29 DIAGNOSIS — E78.1 HYPERTRIGLYCERIDEMIA: Primary | ICD-10-CM

## 2020-06-29 DIAGNOSIS — E55.9 VITAMIN D DEFICIENCY: ICD-10-CM

## 2020-06-29 DIAGNOSIS — R73.03 PRE-DIABETES: ICD-10-CM

## 2020-06-29 PROCEDURE — 99213 OFFICE O/P EST LOW 20 MIN: CPT | Performed by: NURSE PRACTITIONER

## 2020-06-29 PROCEDURE — 3008F BODY MASS INDEX DOCD: CPT | Performed by: NURSE PRACTITIONER

## 2020-06-29 RX ORDER — PHENOL 1.4 %
AEROSOL, SPRAY (ML) MUCOUS MEMBRANE DAILY PRN
COMMUNITY

## 2020-06-29 RX ORDER — BIOTIN 1 MG
TABLET ORAL DAILY
COMMUNITY

## 2020-08-27 ENCOUNTER — TELEPHONE (OUTPATIENT)
Dept: GASTROENTEROLOGY | Facility: AMBULARY SURGERY CENTER | Age: 65
End: 2020-08-27

## 2021-01-14 ENCOUNTER — OFFICE VISIT (OUTPATIENT)
Dept: FAMILY MEDICINE CLINIC | Facility: CLINIC | Age: 66
End: 2021-01-14
Payer: COMMERCIAL

## 2021-01-14 VITALS
HEIGHT: 67 IN | WEIGHT: 202 LBS | BODY MASS INDEX: 31.71 KG/M2 | SYSTOLIC BLOOD PRESSURE: 132 MMHG | HEART RATE: 88 BPM | OXYGEN SATURATION: 98 % | DIASTOLIC BLOOD PRESSURE: 88 MMHG | TEMPERATURE: 97.9 F | RESPIRATION RATE: 16 BRPM

## 2021-01-14 DIAGNOSIS — Z72.0 TOBACCO ABUSE: ICD-10-CM

## 2021-01-14 DIAGNOSIS — R29.810 FACIAL DROOP: Primary | ICD-10-CM

## 2021-01-14 DIAGNOSIS — R73.03 PRE-DIABETES: ICD-10-CM

## 2021-01-14 DIAGNOSIS — E55.9 VITAMIN D DEFICIENCY: ICD-10-CM

## 2021-01-14 DIAGNOSIS — E78.1 HYPERTRIGLYCERIDEMIA: ICD-10-CM

## 2021-01-14 DIAGNOSIS — R20.0 LEFT FACIAL NUMBNESS: ICD-10-CM

## 2021-01-14 DIAGNOSIS — Z12.5 SCREENING FOR MALIGNANT NEOPLASM OF PROSTATE: ICD-10-CM

## 2021-01-14 PROCEDURE — 36415 COLL VENOUS BLD VENIPUNCTURE: CPT | Performed by: NURSE PRACTITIONER

## 2021-01-14 PROCEDURE — 3725F SCREEN DEPRESSION PERFORMED: CPT | Performed by: NURSE PRACTITIONER

## 2021-01-14 PROCEDURE — 99214 OFFICE O/P EST MOD 30 MIN: CPT | Performed by: NURSE PRACTITIONER

## 2021-01-14 RX ORDER — PREDNISONE 20 MG/1
20 TABLET ORAL 2 TIMES DAILY WITH MEALS
Qty: 14 TABLET | Refills: 0 | Status: SHIPPED | OUTPATIENT
Start: 2021-01-14 | End: 2021-01-21

## 2021-01-14 NOTE — PROGRESS NOTES
Assessment/Plan:  1  Facial droop  Suspect bells palsy but need to rule out cva  MRI has been ordered  Will treat with short course of steroids, will check labs  - CBC and differential  - Comprehensive metabolic panel  - Lyme Antibody Profile with reflex to WB  - MRI brain wo contrast; Future  - predniSONE 20 mg tablet; Take 1 tablet (20 mg total) by mouth 2 (two) times a day with meals for 7 days  Dispense: 14 tablet; Refill: 0  2  Left facial numbness  Suspect bells palsy but need to rule out cva  MRI has been ordered  Will treat with short course of steroids, will check labs  - CBC and differential  - Comprehensive metabolic panel  - Vitamin B12  - Lyme Antibody Profile with reflex to WB  - MRI brain wo contrast; Future  - predniSONE 20 mg tablet; Take 1 tablet (20 mg total) by mouth 2 (two) times a day with meals for 7 days  Dispense: 14 tablet; Refill: 0  3  Tobacco abuse  Tobacco Cessation Counseling: Tobacco cessation counseling and education was provided  The patient is sincerely urged to quit consumption of tobacco  He is not ready to quit tobacco  The numerous health risks of tobacco consumption were discussed  If he decides to quit, there are a number of helpful adjunctive aids, and he can see me to discuss nicotine replacement therapy, chantix, or bupropion anytime in the future  4  Pre-diabetes  - CBC and differential  - Comprehensive metabolic panel  - Hemoglobin A1C  5  Hypertriglyceridemia  - CBC and differential  - Comprehensive metabolic panel  - Lipid panel  6  Vitamin D deficiency  - CBC and differential  - Comprehensive metabolic panel  - Vitamin D 25 hydroxy  7  Screening for malignant neoplasm of prostate  - PSA Total, Diagnostic    Patient was counseled regarding instructions for management which included: impression/diagnosis, risk/benefits of treatment options, importance of compliance with treatment, risk factor reduction, and prognosis     I have reviewed the instructions with the patient answering all questions and patient verbalized understanding  BMI Counseling: Body mass index is 31 64 kg/m²  The BMI is above normal  Nutrition recommendations include reducing portion sizes, decreasing overall calorie intake, moderation in carbohydrate intake, reducing intake of saturated fat and trans fat and reducing intake of cholesterol  Exercise recommendations include exercising 3-5 times per week  Depression Screening Follow-up Plan: Patient's depression screening was positive with a PHQ-2 score of 2  Patient assessed for underlying major depression  They have no active suicidal ideations  Brief counseling provided and recommend additional follow-up/re-evaluation next office visit  Subjective:      Patient ID: Iman Scales is a 60 y o  male who presents for facial numbness and neck pain    Symptoms started 4 days ago  Woke up with left sided neck pain, stiff  Progressed to pain left ear and left jaw    2 days ago started with left sided of face numbness, left eye started tearing  Left side of lips numb  Thinks left side of face is droopy  Feels like cannot shut left eye  Denies focal weakness, no speech disturbance, no balance/gait issues        The following portions of the patient's history were reviewed and updated as appropriate: allergies, current medications, past family history, past medical history, past social history, past surgical history and problem list     Review of Systems   Constitutional: Negative for fever  HENT: Negative for congestion and drooling  Eyes: Negative for discharge  Cannot shut left eye  Left eye tearing   Respiratory: Negative for shortness of breath  Cardiovascular: Negative for chest pain and palpitations  Gastrointestinal: Negative for diarrhea, nausea and vomiting  Skin: Negative for rash and wound  Neurological: Positive for facial asymmetry, numbness (left side of face) and headaches (mild)   Negative for dizziness, seizures and speech difficulty  Objective:      /88   Pulse 88   Temp 97 9 °F (36 6 °C) (Temporal)   Resp 16   Ht 5' 7" (1 702 m)   Wt 91 6 kg (202 lb)   SpO2 98%   BMI 31 64 kg/m²          Physical Exam  Constitutional:       General: He is not in acute distress  Appearance: He is not ill-appearing  HENT:      Head: Normocephalic  Eyes:      Extraocular Movements: Extraocular movements intact  Pupils: Pupils are equal, round, and reactive to light  Cardiovascular:      Rate and Rhythm: Normal rate and regular rhythm  Pulmonary:      Effort: Pulmonary effort is normal  No respiratory distress  Breath sounds: Normal breath sounds  No wheezing  Skin:     General: Skin is warm and dry  Coloration: Skin is not jaundiced or pale  Neurological:      Mental Status: He is alert and oriented to person, place, and time  Sensory: Sensory deficit present  Coordination: Coordination normal       Gait: Gait normal       Comments: Left facial droop  Decreased strength left eye/lid  Decreased sensation left side of face from forehead to cheek  Motor strength 5/5 all 4 extremities  Gait wnl  Speech clear  Uvula midline, tongue midline   Psychiatric:         Mood and Affect: Mood normal          Behavior: Behavior normal          Thought Content:  Thought content normal          Judgment: Judgment normal

## 2021-01-14 NOTE — PATIENT INSTRUCTIONS
MRI has been ordered as discussed  Needs to be scheduled  Will check labs  Prednisone 20mg twice daily with food to treat possible [place] Palsy  Will re-evaluate in the office next week  Go to ED if any symptoms worsen or new symptom develop such as weakness on one side of the body, trouble with speech, trouble with vision, difficulty walking, etc        [place] Palsy   WHAT YOU NEED TO KNOW:   Bell palsy is a sudden weakness or paralysis of one side of your face  Bell palsy occurs when the nerve that controls the muscles in your face becomes swollen or irritated  DISCHARGE INSTRUCTIONS:   Medicines:   · Medicine may be given  to decrease swelling and irritation of your facial nerve  You may receive antiviral medicine if your healthcare provider thinks a virus caused your Bell palsy  Your healthcare provider may also suggest acetaminophen or ibuprofen to reduce pain  These medicines are available without a doctor's order  Ask which medicine to take, and how much you need  Follow directions  Acetaminophen can cause liver damage, and ibuprofen can cause stomach bleeding or kidney damage  · Take your medicine as directed  Contact your healthcare provider if you think your medicine is not helping or if you have side effects  Tell him if you are allergic to any medicine  Keep a list of the medicines, vitamins, and herbs you take  Include the amounts, and when and why you take them  Bring the list or the pill bottles to follow-up visits  Carry your medicine list with you in case of an emergency  Follow up with your healthcare provider as directed:  Write down your questions so you remember to ask them during your visits  Eye care: Your healthcare provider may recommend that you use artificial tears during the day to keep your eye moist  You may need to use an eye ointment at night  You may also need to wear a patch over your eye and tape it shut while you sleep   This helps keep your eye from getting dry and infected  Wear sunglasses to protect your eye from direct sunlight  Stay away from places that have fumes, dust, or other particles in the air that may harm your eye  Physical therapy:  A physical therapist may teach you how to massage your face and exercise the nerves and muscles in your face  This may help you get better sooner and prevent long-term problems  You can exercise on your own when your facial movement begins to return  Open and close your eye, wink, and smile wide  Do the exercises for 15 or 20 minutes several times a day  Contact your healthcare provider if:   · You have a fever  · Your eye becomes red, irritated, or painful  · You have questions or concerns about your condition or care  Return to the emergency department if:   · You develop weakness or numbness on one side of your body (other than your face)  · You have double vision, or you lose vision in your eye  · You have trouble thinking clearly  © Copyright [address] Information is for End User's use only and may not be sold, redistributed or otherwise used for commercial purposes  All illustrations and images included in CareNotes® are the copyrighted property of A D A M , Inc  or [facility] Joon Hart   The above information is an  only  It is not intended as medical advice for individual conditions or treatments  Talk to your doctor, nurse or pharmacist before following any medical regimen to see if it is safe and effective for you

## 2021-01-15 ENCOUNTER — TELEPHONE (OUTPATIENT)
Dept: FAMILY MEDICINE CLINIC | Facility: CLINIC | Age: 66
End: 2021-01-15

## 2021-01-15 LAB
25(OH)D3+25(OH)D2 SERPL-MCNC: 15.4 NG/ML (ref 30–100)
ALBUMIN SERPL-MCNC: 4.8 G/DL (ref 3.8–4.8)
ALBUMIN/GLOB SERPL: 1.9 {RATIO} (ref 1.2–2.2)
ALP SERPL-CCNC: 33 IU/L (ref 39–117)
ALT SERPL-CCNC: 23 IU/L (ref 0–44)
AST SERPL-CCNC: 20 IU/L (ref 0–40)
B BURGDOR IGG+IGM SER-ACNC: <0.91 ISR (ref 0–0.9)
BASOPHILS # BLD AUTO: 0.1 X10E3/UL (ref 0–0.2)
BASOPHILS NFR BLD AUTO: 1 %
BILIRUB SERPL-MCNC: 0.4 MG/DL (ref 0–1.2)
BUN SERPL-MCNC: 17 MG/DL (ref 8–27)
BUN/CREAT SERPL: 20 (ref 10–24)
CALCIUM SERPL-MCNC: 9.7 MG/DL (ref 8.6–10.2)
CHLORIDE SERPL-SCNC: 103 MMOL/L (ref 96–106)
CHOLEST SERPL-MCNC: 202 MG/DL (ref 100–199)
CHOLEST/HDLC SERPL: 6.7 RATIO (ref 0–5)
CO2 SERPL-SCNC: 19 MMOL/L (ref 20–29)
CREAT SERPL-MCNC: 0.87 MG/DL (ref 0.76–1.27)
EOSINOPHIL # BLD AUTO: 0.1 X10E3/UL (ref 0–0.4)
EOSINOPHIL NFR BLD AUTO: 2 %
ERYTHROCYTE [DISTWIDTH] IN BLOOD BY AUTOMATED COUNT: 12.7 % (ref 11.6–15.4)
EST. AVERAGE GLUCOSE BLD GHB EST-MCNC: 131 MG/DL
GLOBULIN SER-MCNC: 2.5 G/DL (ref 1.5–4.5)
GLUCOSE SERPL-MCNC: 99 MG/DL (ref 65–99)
HBA1C MFR BLD: 6.2 % (ref 4.8–5.6)
HCT VFR BLD AUTO: 46 % (ref 37.5–51)
HDLC SERPL-MCNC: 30 MG/DL
HGB BLD-MCNC: 16.2 G/DL (ref 13–17.7)
IMM GRANULOCYTES # BLD: 0 X10E3/UL (ref 0–0.1)
IMM GRANULOCYTES NFR BLD: 1 %
LDLC SERPL CALC-MCNC: 129 MG/DL (ref 0–99)
LYMPHOCYTES # BLD AUTO: 1.7 X10E3/UL (ref 0.7–3.1)
LYMPHOCYTES NFR BLD AUTO: 26 %
MCH RBC QN AUTO: 32.9 PG (ref 26.6–33)
MCHC RBC AUTO-ENTMCNC: 35.2 G/DL (ref 31.5–35.7)
MCV RBC AUTO: 93 FL (ref 79–97)
MONOCYTES # BLD AUTO: 0.6 X10E3/UL (ref 0.1–0.9)
MONOCYTES NFR BLD AUTO: 10 %
NEUTROPHILS # BLD AUTO: 4 X10E3/UL (ref 1.4–7)
NEUTROPHILS NFR BLD AUTO: 60 %
PLATELET # BLD AUTO: 236 X10E3/UL (ref 150–450)
POTASSIUM SERPL-SCNC: 4.2 MMOL/L (ref 3.5–5.2)
PROT SERPL-MCNC: 7.3 G/DL (ref 6–8.5)
PSA SERPL-MCNC: 1.2 NG/ML (ref 0–4)
RBC # BLD AUTO: 4.93 X10E6/UL (ref 4.14–5.8)
SL AMB EGFR AFRICAN AMERICAN: 105 ML/MIN/1.73
SL AMB EGFR NON AFRICAN AMERICAN: 91 ML/MIN/1.73
SL AMB VLDL CHOLESTEROL CALC: 43 MG/DL (ref 5–40)
SODIUM SERPL-SCNC: 139 MMOL/L (ref 134–144)
TRIGL SERPL-MCNC: 238 MG/DL (ref 0–149)
VIT B12 SERPL-MCNC: 301 PG/ML (ref 232–1245)
WBC # BLD AUTO: 6.4 X10E3/UL (ref 3.4–10.8)

## 2021-01-15 NOTE — TELEPHONE ENCOUNTER
I spoke to pt and he stated that was the earliest appt  I called [facility] and got him an appt for tomorrow, 1/16/21 at 11am  Pt is aware

## 2021-01-16 ENCOUNTER — HOSPITAL ENCOUNTER (OUTPATIENT)
Dept: RADIOLOGY | Facility: HOSPITAL | Age: 66
Discharge: HOME | End: 2021-01-16
Payer: COMMERCIAL

## 2021-01-16 DIAGNOSIS — R20.0 LEFT FACIAL NUMBNESS: ICD-10-CM

## 2021-01-16 DIAGNOSIS — R29.810 FACIAL DROOP: ICD-10-CM

## 2021-01-16 PROCEDURE — G1004 CDSM NDSC: HCPCS

## 2021-01-16 PROCEDURE — 70551 MRI BRAIN STEM W/O DYE: CPT

## 2021-01-19 ENCOUNTER — OFFICE VISIT (OUTPATIENT)
Dept: FAMILY MEDICINE CLINIC | Facility: CLINIC | Age: 66
End: 2021-01-19
Payer: COMMERCIAL

## 2021-01-19 VITALS
HEART RATE: 90 BPM | BODY MASS INDEX: 32.02 KG/M2 | OXYGEN SATURATION: 95 % | WEIGHT: 204 LBS | RESPIRATION RATE: 16 BRPM | TEMPERATURE: 96.5 F | SYSTOLIC BLOOD PRESSURE: 126 MMHG | HEIGHT: 67 IN | DIASTOLIC BLOOD PRESSURE: 84 MMHG

## 2021-01-19 DIAGNOSIS — G51.0 BELL'S PALSY: Primary | ICD-10-CM

## 2021-01-19 DIAGNOSIS — R73.03 PRE-DIABETES: ICD-10-CM

## 2021-01-19 DIAGNOSIS — R29.810 FACIAL DROOP: ICD-10-CM

## 2021-01-19 DIAGNOSIS — E55.9 VITAMIN D DEFICIENCY: ICD-10-CM

## 2021-01-19 DIAGNOSIS — E78.1 HYPERTRIGLYCERIDEMIA: ICD-10-CM

## 2021-01-19 DIAGNOSIS — Q67.0 ASYMMETRY, FACIAL: ICD-10-CM

## 2021-01-19 DIAGNOSIS — G52.7 CRANIAL NEUROPATHIES, MULTIPLE: ICD-10-CM

## 2021-01-19 PROCEDURE — 99214 OFFICE O/P EST MOD 30 MIN: CPT | Performed by: NURSE PRACTITIONER

## 2021-01-19 RX ORDER — ERGOCALCIFEROL 1.25 MG/1
50000 CAPSULE ORAL WEEKLY
Qty: 12 CAPSULE | Refills: 3 | Status: CANCELLED | OUTPATIENT
Start: 2021-01-19

## 2021-01-19 NOTE — PATIENT INSTRUCTIONS
As per discussion, MRI with contrast has been ordered to further assess some abnormalities on MRI  Schedule appt with ENT to evaluate abnormalities on MRI  Finish Prednisone  Heart healthy, carbohydrate controlled diet  Your cholesterol numbers are elevated as discussed   Recommend that you start taking over the counter Fish oil/omega 3, 1000mg, 2 capsules daily  Vitamin D3 2000 units daily

## 2021-01-19 NOTE — PROGRESS NOTES
Assessment/Plan:  1  Bell's palsy  Improving  Finish prednisone  Monitor  Will re-eval in office in 4 weeks  2  Facial droop  MRI reviewed  No acute CVA, but some abnormalities noted  Recommended MRI with contrast to assess cranial neuropathies and soft tissue asymmetry  Finish prednisone to treat bells palsy  Monitor  Schedule enhanced MRI    - MRI brain w wo contrast; Future  3  Cranial neuropathies, multiple  MRI reviewed  No acute CVA, but some abnormalities noted  Recommended MRI with contrast to assess cranial neuropathies and soft tissue asymmetry  Finish prednisone to treat bells palsy  Monitor  Schedule enhanced MRI    - MRI brain w wo contrast; Future  - Ambulatory Referral to Otolaryngology; Future  4  Hypertriglyceridemia  Heart healthy diet  Fish oil otc  Regular exercise  5  Vitamin D deficiency  otc vitamin D3 2000 units daily  6  Pre-diabetes  Carb controlled diet  Regular exercise  Weight loss  7  Asymmetry, facial  MRI reviewed  No acute CVA, but some abnormalities noted  Recommended MRI with contrast to assess cranial neuropathies and soft tissue asymmetry  Finish prednisone to treat bells palsy  Monitor  Schedule enhanced MRI  - Ambulatory Referral to Otolaryngology; Future    Patient was counseled regarding instructions for management which included: impression/diagnosis, risk/benefits of treatment options, importance of compliance with treatment, risk factor reduction, and prognosis  I have reviewed the instructions with the patient answering all questions and patient verbalized understanding  Subjective:      Patient ID: Tha Yousif is a 60 y o  male who presents for follow up    Follow up on symptoms of left facial droop and numbness  Symptoms started about one week ago  Seen in the office about 4 days after onset of symptoms  Suspect bells palsy and was treated with prednisone for one week     Had MRI a few days ago  Tolerating prednisone with no side effects  Still with left sided facial droop and numbness but improving  Able to close left eye  Left eye not tearing as much  No new symptoms  No dizziness or headache  The following portions of the patient's history were reviewed and updated as appropriate: allergies, current medications, past family history, past medical history, past social history, past surgical history and problem list     Review of Systems   Constitutional: Negative for fatigue and fever  HENT: Negative for congestion, sinus pain and trouble swallowing  Eyes: Negative for visual disturbance  Left eye tearing     Respiratory: Negative for cough, chest tightness and shortness of breath  Cardiovascular: Negative for chest pain, palpitations and leg swelling  Endocrine: Negative for cold intolerance, heat intolerance, polydipsia, polyphagia and polyuria  Allergic/Immunologic: Negative for immunocompromised state  Neurological: Positive for facial asymmetry and numbness (left side of face)  Negative for dizziness and headaches  Objective:  MRI brain wo contrast 1/16/2021  FINDINGS:     BRAIN PARENCHYMA:  There is no discrete mass, mass effect or midline shift  There is no intracranial hemorrhage  There is no evidence of acute infarction and diffusion imaging is unremarkable  There are no white matter changes in the cerebral   hemispheres  The     No structural pathology in the CP angle cistern  Cisternal segment of the 7th/8th nerve complex and also the 5th nerve appear normal   The given history, contrast imaging of the brain is suggested      VENTRICLES:  Normal for the patient's age      SELLA AND PITUITARY GLAND:  Normal      ORBITS:  Normal      PARANASAL SINUSES:  Trace sinus mucosal disease     VASCULATURE:  Evaluation of the major intracranial vasculature demonstrates appropriate flow voids      CALVARIUM AND SKULL BASE:  Minor soft tissue asymmetry in the left nasopharynx/fossa Rosenmuller    Visualized parapharyngeal spaces normal   Pterygopalatine fossa is normal   Nonetheless, direct visualization of the nasopharynx is suggested      EXTRACRANIAL SOFT TISSUES:  Soft tissue asymmetry left nasopharynx      IMPRESSION:     No acute intracranial disease        However, enhanced MR of the brain is suggested given multiple cranial neuropathies      Soft tissue asymmetry although subtle, within the left nasopharynx and fossa of Rosenmuller    ENT consult and direct visualization are suggested        Recent Results (from the past 672 hour(s))   CBC and differential    Collection Time: 01/14/21  2:48 PM   Result Value Ref Range    White Blood Cell Count 6 4 3 4 - 10 8 x10E3/uL    Red Blood Cell Count 4 93 4 14 - 5 80 x10E6/uL    Hemoglobin 16 2 13 0 - 17 7 g/dL    HCT 46 0 37 5 - 51 0 %    MCV 93 79 - 97 fL    MCH 32 9 26 6 - 33 0 pg    MCHC 35 2 31 5 - 35 7 g/dL    RDW 12 7 11 6 - 15 4 %    Platelet Count 792 854 - 450 x10E3/uL    Neutrophils 60 Not Estab  %    Lymphocytes 26 Not Estab  %    Monocytes 10 Not Estab  %    Eosinophils 2 Not Estab  %    Basophils PCT 1 Not Estab  %    Neutrophils (Absolute) 4 0 1 4 - 7 0 x10E3/uL    Lymphocytes (Absolute) 1 7 0 7 - 3 1 x10E3/uL    Monocytes (Absolute) 0 6 0 1 - 0 9 x10E3/uL    Eosinophils (Absolute) 0 1 0 0 - 0 4 x10E3/uL    Basophils ABS 0 1 0 0 - 0 2 x10E3/uL    Immature Granulocytes 1 Not Estab  %    Immature Granulocytes (Absolute) 0 0 0 0 - 0 1 x10E3/uL   Comprehensive metabolic panel    Collection Time: 01/14/21  2:48 PM   Result Value Ref Range    Glucose, Random 99 65 - 99 mg/dL    BUN 17 8 - 27 mg/dL    Creatinine 0 87 0 76 - 1 27 mg/dL    eGFR Non African American 91 >59 mL/min/1 73    eGFR  105 >59 mL/min/1 73    SL AMB BUN/CREATININE RATIO 20 10 - 24    Sodium 139 134 - 144 mmol/L    Potassium 4 2 3 5 - 5 2 mmol/L    Chloride 103 96 - 106 mmol/L    CO2 19 (L) 20 - 29 mmol/L    CALCIUM 9 7 8 6 - 10 2 mg/dL    Protein, Total 7 3 6 0 - 8 5 g/dL Albumin 4 8 3 8 - 4 8 g/dL    Globulin, Total 2 5 1 5 - 4 5 g/dL    Albumin/Globulin Ratio 1 9 1 2 - 2 2    TOTAL BILIRUBIN 0 4 0 0 - 1 2 mg/dL    Alk Phos Isoenzymes 33 (L) 39 - 117 IU/L    AST 20 0 - 40 IU/L    ALT 23 0 - 44 IU/L   Hemoglobin A1C    Collection Time: 01/14/21  2:48 PM   Result Value Ref Range    Hemoglobin A1C 6 2 (H) 4 8 - 5 6 %    Estimated Average Glucose 131 mg/dL   Lipid panel    Collection Time: 01/14/21  2:48 PM   Result Value Ref Range    Cholesterol, Total 202 (H) 100 - 199 mg/dL    Triglycerides 238 (H) 0 - 149 mg/dL    HDL 30 (L) >39 mg/dL    VLDL Cholesterol Calculated 43 (H) 5 - 40 mg/dL    LDL Calculated 129 (H) 0 - 99 mg/dL    T  Chol/HDL Ratio 6 7 (H) 0 0 - 5 0 ratio   Vitamin D 25 hydroxy    Collection Time: 01/14/21  2:48 PM   Result Value Ref Range    25-HYDROXY VIT D 15 4 (L) 30 0 - 100 0 ng/mL   Vitamin B12    Collection Time: 01/14/21  2:48 PM   Result Value Ref Range    Vitamin B-12 301 232 - 1,245 pg/mL   PSA Total, Diagnostic    Collection Time: 01/14/21  2:48 PM   Result Value Ref Range    Prostate Specific Antigen Total 1 2 0 0 - 4 0 ng/mL   Lyme Antibody Profile with reflex to WB    Collection Time: 01/14/21  2:48 PM   Result Value Ref Range    Lyme IgG/IgM Ab <0 91 0 00 - 0 90 ISR     Reviewed lab/diagnostic results with pt including both normal and abnormal findings  In depth counseling and instructions given  All questions answered during visit  /84 (BP Location: Right arm, Patient Position: Sitting, Cuff Size: Large)   Pulse 90   Temp (!) 96 5 °F (35 8 °C) (Temporal)   Resp 16   Ht 5' 7" (1 702 m)   Wt 92 5 kg (204 lb)   SpO2 95%   BMI 31 95 kg/m²          Physical Exam  Vitals signs reviewed  Constitutional:       General: He is not in acute distress  Appearance: He is not ill-appearing or diaphoretic  Eyes:      Extraocular Movements: Extraocular movements intact  Pupils: Pupils are equal, round, and reactive to light     Neck: Musculoskeletal: Normal range of motion and neck supple  No neck rigidity  Vascular: No carotid bruit  Cardiovascular:      Rate and Rhythm: Normal rate and regular rhythm  Pulmonary:      Effort: Pulmonary effort is normal  No respiratory distress  Breath sounds: Normal breath sounds  No wheezing  Skin:     General: Skin is warm and dry  Coloration: Skin is not jaundiced or pale  Neurological:      Mental Status: He is alert  Coordination: Coordination normal       Gait: Gait normal       Comments: Left facial droop  Left face with decreased sensation  Decrease strength occular muscles left  Tongue midline  Speech clear and appropriate  No focal weakness   Psychiatric:         Mood and Affect: Mood normal          Behavior: Behavior normal          Thought Content:  Thought content normal          Judgment: Judgment normal

## 2021-01-21 ENCOUNTER — OFFICE VISIT (OUTPATIENT)
Dept: AUDIOLOGY | Facility: CLINIC | Age: 66
End: 2021-01-21
Payer: COMMERCIAL

## 2021-01-21 ENCOUNTER — OFFICE VISIT (OUTPATIENT)
Dept: OTOLARYNGOLOGY | Facility: CLINIC | Age: 66
End: 2021-01-21
Payer: COMMERCIAL

## 2021-01-21 VITALS
BODY MASS INDEX: 31.86 KG/M2 | HEIGHT: 67 IN | WEIGHT: 203 LBS | DIASTOLIC BLOOD PRESSURE: 82 MMHG | SYSTOLIC BLOOD PRESSURE: 132 MMHG | TEMPERATURE: 97.5 F | HEART RATE: 103 BPM | OXYGEN SATURATION: 98 %

## 2021-01-21 DIAGNOSIS — Q67.0 ASYMMETRY, FACIAL: Primary | ICD-10-CM

## 2021-01-21 DIAGNOSIS — G52.7 CRANIAL NEUROPATHIES, MULTIPLE: ICD-10-CM

## 2021-01-21 DIAGNOSIS — G51.0 BELL'S PALSY: ICD-10-CM

## 2021-01-21 DIAGNOSIS — H91.92 HEARING LOSS OF LEFT EAR, UNSPECIFIED HEARING LOSS TYPE: ICD-10-CM

## 2021-01-21 DIAGNOSIS — J39.2 MASS OF NASOPHARYNX: ICD-10-CM

## 2021-01-21 DIAGNOSIS — H90.3 SENSORY HEARING LOSS, BILATERAL: Primary | ICD-10-CM

## 2021-01-21 PROCEDURE — 4004F PT TOBACCO SCREEN RCVD TLK: CPT | Performed by: OTOLARYNGOLOGY

## 2021-01-21 PROCEDURE — 3008F BODY MASS INDEX DOCD: CPT | Performed by: OTOLARYNGOLOGY

## 2021-01-21 PROCEDURE — 92511 NASOPHARYNGOSCOPY: CPT | Performed by: OTOLARYNGOLOGY

## 2021-01-21 PROCEDURE — 92557 COMPREHENSIVE HEARING TEST: CPT | Performed by: AUDIOLOGIST

## 2021-01-21 PROCEDURE — 92567 TYMPANOMETRY: CPT | Performed by: AUDIOLOGIST

## 2021-01-21 PROCEDURE — 99244 OFF/OP CNSLTJ NEW/EST MOD 40: CPT | Performed by: OTOLARYNGOLOGY

## 2021-01-21 RX ORDER — PREDNISONE 20 MG/1
60 TABLET ORAL DAILY
Qty: 21 TABLET | Refills: 0 | Status: SHIPPED | OUTPATIENT
Start: 2021-01-21

## 2021-01-21 NOTE — PROGRESS NOTES
[facility]  [facility]  [facility]      Otolaryngology -- Head and Neck Surgery New Patient Visit    Kym Warner is a 60 y o  who presents with a chief complaint of bell's palsy    Pertinent elements of the history include:  He presents with new onset left facial paralysis that began about 10 days ago  He was treated with a week of prednisone 40 mg and has seen improvement in left facial droop, eye closing and numbness in a V2 and V3 distribution  He notes new onset tinnitus and muffled hearing in the left ear as well  He had some otalgia but this is resolved  He had a non-contrast MR  Results below:      Review of systems: Pertinent review of systems documented in the HPI  Results reviewed; images from any scan have been personally reviewed:    MRI BRAIN WITHOUT CONTRAST     INDICATION: R29 810: Facial weakness  R20 0: Anesthesia of skin  Left facial droop, left facial numbness, weakness of left muscles of mastication     COMPARISON:   None      TECHNIQUE:  Sagittal T1, axial T2, axial FLAIR, axial T1, axial [place] and axial diffusion imaging  Axial fiesta, axial BRAVO T1     IMAGE QUALITY:  Diagnostic      FINDINGS:     BRAIN PARENCHYMA:  There is no discrete mass, mass effect or midline shift  There is no intracranial hemorrhage  There is no evidence of acute infarction and diffusion imaging is unremarkable  There are no white matter changes in the cerebral   hemispheres  The     No structural pathology in the CP angle cistern    Cisternal segment of the 7th/8th nerve complex and also the 5th nerve appear normal   The given history, contrast imaging of the brain is suggested      VENTRICLES:  Normal for the patient's age      SELLA AND PITUITARY GLAND:  Normal      ORBITS:  Normal      PARANASAL SINUSES:  Trace sinus mucosal disease     VASCULATURE:  Evaluation of the major intracranial vasculature demonstrates appropriate flow voids      CALVARIUM AND SKULL BASE:  Minor soft tissue asymmetry in the left nasopharynx/fossa Rosenmuller  Visualized parapharyngeal spaces normal   Pterygopalatine fossa is normal   Nonetheless, direct visualization of the nasopharynx is suggested      EXTRACRANIAL SOFT TISSUES:  Soft tissue asymmetry left nasopharynx      IMPRESSION:     No acute intracranial disease        However, enhanced MR of the brain is suggested given multiple cranial neuropathies      Soft tissue asymmetry although subtle, within the left nasopharynx and fossa of Rosenmuller  ENT consult and direct visualization are suggested      The study was marked in EPIC for significant and one month follow-up notification  The past medical, surgical, social and family history have been reviewed as documented in today's record      Past Medical History:   Diagnosis Date    Colon polyp     EKG abnormality     h/o " skipped beat"  had stress test ( several years ago) - was normal    Obesity        Past Surgical History:   Procedure Laterality Date    COLON SURGERY  2009    divertiulitis    COLONOSCOPY      LUMBAR DISC SURGERY      MT INCISE FINGER TENDON SHEATH Right 7/31/2019    Procedure: RELEASE TRIGGER FINGER RLF;  Surgeon: Lex Arthur DO;  [facility]  Service: Orthopedics    TRIGGER FINGER RELEASE Right     Ring Finger        Family History   Problem Relation Age of Onset    Cancer Mother     Lung cancer Father     No Known Problems Sister     No Known Problems Brother     No Known Problems Maternal Aunt     No Known Problems Maternal Uncle     No Known Problems Paternal Aunt     No Known Problems Paternal Uncle     No Known Problems Maternal Grandmother     No Known Problems Maternal Grandfather     No Known Problems Paternal Grandmother     No Known Problems Paternal Grandfather     Mental illness Neg Hx     Substance Abuse Neg Hx        Social History     Socioeconomic History    Marital status:      Spouse name: Not on file    Number of children: Not on file    Years of education: Not on file    Highest education level: Not on file   Occupational History    Occupation: reitred   Social Needs    Financial resource strain: Not on file    Food insecurity     Worry: Not on file     Inability: Not on file   [employer] needs     Medical: Not on file     Non-medical: Not on file   Tobacco Use    Smoking status: Current Every Day Smoker     Packs/day: 1 00     Years: 35 00     Pack years: 35 00     Types: Cigarettes    Smokeless tobacco: Never Used   Substance and Sexual Activity    Alcohol use: Never     Frequency: Never    Drug use: Never    Sexual activity: Not Currently   Lifestyle    Physical activity     Days per week: Not on file     Minutes per session: Not on file    Stress: Not on file   Relationships    Social connections     Talks on phone: Not on file     Gets together: Not on file     Attends Latter-day service: Not on file     Active member of club or organization: Not on file     Attends meetings of clubs or organizations: Not on file     Relationship status: Not on file    Intimate partner violence     Fear of current or ex partner: Not on file     Emotionally abused: Not on file     Physically abused: Not on file     Forced sexual activity: Not on file   Other Topics Concern    Not on file   Social History Narrative    Not on file       Current Outpatient Medications on File Prior to Visit   Medication Sig Dispense Refill    Cholecalciferol (VITAMIN D3) 25 MCG (1000 UT) CAPS Take by mouth daily       Melatonin 10 MG TABS Take by mouth daily as needed      Naproxen Sodium (Aleve) 220 MG CAPS Take by mouth 2 (two) times a day as needed       [DISCONTINUED] predniSONE 20 mg tablet Take 1 tablet (20 mg total) by mouth 2 (two) times a day with meals for 7 days (Patient not taking: Reported on 1/21/2021) 14 tablet 0     No current facility-administered medications on file prior to visit  Physical exam:   /82 (BP Location: Left arm, Patient Position: Sitting, Cuff Size: Adult)   Pulse 103   Temp 97 5 °F (36 4 °C) (Temporal)   Ht 5' 7" (1 702 m)   Wt 92 1 kg (203 lb)   SpO2 98%   BMI 31 79 kg/m²     Constitutional:  Well developed, well nourished and groomed, in no acute distress  Eyes:  Extra-ocular movements intact, pupils equally round and reactive to light and accommodation, the lids and conjunctivae are normal in appearance  Head: Atraumatic, normocephalic, no visible scalp lesions, bony palpation unremarkable without stepoffs, parotid and submandibular salivary glands non-tender to palpation and without masses bilaterally  Ears:  Auricles normal in appearance bilaterally, mastoid prominence non-tender, external auditory canals clear bilaterally  Tympanic membranes intact  Normal appearing ossicles  Nose/Sinuses:  External appearance unremarkable, no maxillary or frontal sinus tenderness to palpation bilaterally  Anterior rhinoscopy reveals: left septal deviation and turbinate hypertrophy     Oral Cavity:  Moist mucus membranes, gums and dentition unremarkable, no oral mucosal masses or lesions, floor of mouth soft, tongue mobile without masses or lesions  Oropharynx:  Base of tongue soft and without masses, tonsils bilaterally unremarkable, soft palate mucosa unremarkable  Neck:  No visible or palpable cervical lesions or lymphadenopathy, thyroid gland is normal in size and symmetry and without masses, normal laryngeal elevation with swallowing  Cardiovascular:  Normal rate and rhythm, no palpable thrills, no jugulovenous distension observed  Respiratory:  Normal respiratory effort without evidence of retractions or use of accessory muscles  Integument:  Normal appearing without observed masses or lesions    Neurologic:  Cranial nerve exam: slight numbness left V3 region, house brackman 2/6 left upper division, 3/6 left lower division; tongue deviates slightly to the right though I do not believe this is a XII weakness  Psychiatric:  Alert and oriented to time, place and person, normal affect  Procedures  The nasal cavities were decongested with lidocaine and oxymetazoline spray  Bilateral nasal endoscopy was performed as follows: Location: Bilateral nasal cavity  Endoscopy type: Flexible  Results: Nasal cavity and nasopharynx unremarkable  No evidence of mass  Fossa of Rosenmuller clear bilaterally  The patient tolerated the procedure well  Assessment:   1  Asymmetry, facial  Ambulatory Referral to Otolaryngology   2  Cranial neuropathies, multiple  Ambulatory Referral to Otolaryngology   3  Mass of nasopharynx     4  Hearing loss of left ear, unspecified hearing loss type  Audiogram screen   5  Bell's palsy  predniSONE 20 mg tablet       Orders  Orders Placed This Encounter   Procedures    Audiogram screen     Standing Status:   Future     Standing Expiration Date:   1/21/2022         Discussion/Plan:    1  His audiogram reveals symmetric Downward sloping sensorineural hearing loss consistent with presbycusis  This is likely unrelated to his acute symptoms of facial paralysis  2   While he has seen improvement in both facial paralysis and numbness, improvement is incomplete  He only had 1 week of 40 mg prednisone  Typically our standard is 2 weeks at 60 mg without a taper  I placed him on another 60 mg 1 week course and recommend that he follow-up in 1 month  3   I examined his nasopharynx with a flexible endoscope  There is no evidence of a suspicious lesion within the nasopharynx  I gave him reassurance

## 2021-02-05 ENCOUNTER — HOSPITAL ENCOUNTER (OUTPATIENT)
Dept: RADIOLOGY | Facility: HOSPITAL | Age: 66
Discharge: HOME | End: 2021-02-05
Payer: COMMERCIAL

## 2021-02-05 DIAGNOSIS — R29.810 FACIAL DROOP: ICD-10-CM

## 2021-02-05 DIAGNOSIS — G52.7 CRANIAL NEUROPATHIES, MULTIPLE: ICD-10-CM

## 2021-02-05 PROCEDURE — A9585 GADOBUTROL INJECTION: HCPCS | Performed by: NURSE PRACTITIONER

## 2021-02-05 PROCEDURE — G1004 CDSM NDSC: HCPCS

## 2021-02-05 PROCEDURE — 70553 MRI BRAIN STEM W/O & W/DYE: CPT

## 2021-02-05 RX ADMIN — GADOBUTROL 9 ML: 604.72 INJECTION INTRAVENOUS at 11:01

## 2021-02-16 ENCOUNTER — TELEMEDICINE (OUTPATIENT)
Dept: FAMILY MEDICINE CLINIC | Facility: CLINIC | Age: 66
End: 2021-02-16
Payer: COMMERCIAL

## 2021-02-16 VITALS — WEIGHT: 203 LBS | HEIGHT: 67 IN | BODY MASS INDEX: 31.86 KG/M2

## 2021-02-16 DIAGNOSIS — G51.0 BELL'S PALSY: Primary | ICD-10-CM

## 2021-02-16 PROCEDURE — 99213 OFFICE O/P EST LOW 20 MIN: CPT | Performed by: NURSE PRACTITIONER

## 2021-02-16 NOTE — PROGRESS NOTES
Virtual Brief Visit    Assessment/Plan:  1  Bell's palsy  Resolving  Finished steroid course  Continue to monitor  Call office if develop any symptoms of concern    Problem List Items Addressed This Visit     None      Visit Diagnoses     Bell's palsy    -  Primary                Reason for visit is   Chief Complaint   Patient presents with    Follow-up     bells palsy fu    Virtual Brief Visit        Encounter provider ARACELI Hurt    Provider located at [address] [id number]    Recent Visits  No visits were found meeting these conditions  Showing recent visits within past 7 days and meeting all other requirements     Today's Visits  Date Type Provider Dept   02/16/21 Telemedicine ARACELI Hurt [facility]   Showing today's visits and meeting all other requirements     Future Appointments  No visits were found meeting these conditions  Showing future appointments within next 150 days and meeting all other requirements        After connecting through telephone, the patient was identified by name and date of birth  Evelin Ramos was informed that this is a telemedicine visit and that the visit is being conducted through telephone  My office door was closed  No one else was in the room  He acknowledged consent and understanding of privacy and security of the platform  The patient has agreed to participate and understands he can discontinue the visit at any time  It was my intent to perform this visit via video technology but the patient was not able to do a video connection so the visit was completed via audio telephone only  Patient is aware this is a billable service  Subjective    Evelin Ramos is a 60 y o  male , follow up  Follow up on bells palsy  Doing much better  Symptoms have either resolved or improved  Eye not watering anymore   No facial numbness  Thinks still has very slight mouth droop  Facial swelling resolved  Finished full course of steroids  Saw ENT and was prescribed second week of high dose steroids (60mg) daily  Had repeat MRI with contrast         Past Medical History:   Diagnosis Date    Colon polyp     EKG abnormality     h/o " skipped beat"  had stress test ( several years ago) - was normal    Obesity        Past Surgical History:   Procedure Laterality Date    COLON SURGERY  2009    divertiulitis    COLONOSCOPY      LUMBAR DISC SURGERY      GA INCISE FINGER TENDON SHEATH Right 7/31/2019    Procedure: RELEASE TRIGGER FINGER RLF;  Surgeon: Chayito Roy DO;  Location: [facility];  Service: Orthopedics    TRIGGER FINGER RELEASE Right     Ring Finger        Current Outpatient Medications   Medication Sig Dispense Refill    Cholecalciferol (VITAMIN D3) 25 MCG (1000 UT) CAPS Take by mouth daily       Melatonin 10 MG TABS Take by mouth daily as needed      Naproxen Sodium (Aleve) 220 MG CAPS Take by mouth 2 (two) times a day as needed       predniSONE 20 mg tablet Take 3 tablets (60 mg total) by mouth daily (Patient not taking: Reported on 2/16/2021) 21 tablet 0     No current facility-administered medications for this visit  Allergies   Allergen Reactions    Latex Irritability       Review of Systems   HENT: Positive for tinnitus (slight at times)  Negative for facial swelling  Eyes: Negative for discharge and visual disturbance  Respiratory: Negative for shortness of breath  Cardiovascular: Negative for chest pain and palpitations  Musculoskeletal: Negative for arthralgias and myalgias  Skin: Negative for rash and wound  Neurological: Positive for facial asymmetry (still with slight facial droop)  Negative for numbness  Psychiatric/Behavioral: The patient is not nervous/anxious          Vitals:    02/16/21 1006   Weight: 92 1 kg (203 lb)   Height: 5' 7" (1 702 m)     MRI brain with contrast 2/5/21  Mild smooth linear enhancement of left 7th nerve, possible viral neuritis  Recommend follow up imaging if this is refractory to therapy  ENT notes from 1/21 reviewed      I spent 10 minutes with patient today in which greater than 50% of the time was spent in counseling/coordination of care regarding diagnostics, ENT input, symptoms, impression , tx management    VIRTUAL VISIT DISCLAIMER    Hina Perdomo acknowledges that he has consented to an online visit or consultation  He understands that the online visit is based solely on information provided by him, and that, in the absence of a face-to-face physical evaluation by the physician, the diagnosis he receives is both limited and provisional in terms of accuracy and completeness  This is not intended to replace a full medical face-to-face evaluation by the physician  Hina Perdomo understands and accepts these terms

## 2021-02-25 ENCOUNTER — OFFICE VISIT (OUTPATIENT)
Dept: OTOLARYNGOLOGY | Facility: CLINIC | Age: 66
End: 2021-02-25
Payer: COMMERCIAL

## 2021-02-25 VITALS — BODY MASS INDEX: 31.71 KG/M2 | TEMPERATURE: 98.3 F | HEIGHT: 67 IN | WEIGHT: 202 LBS

## 2021-02-25 DIAGNOSIS — G51.0 BELL'S PALSY: Primary | ICD-10-CM

## 2021-02-25 PROCEDURE — 3008F BODY MASS INDEX DOCD: CPT | Performed by: OTOLARYNGOLOGY

## 2021-02-25 PROCEDURE — 4004F PT TOBACCO SCREEN RCVD TLK: CPT | Performed by: OTOLARYNGOLOGY

## 2021-02-25 PROCEDURE — 99212 OFFICE O/P EST SF 10 MIN: CPT | Performed by: OTOLARYNGOLOGY

## 2021-02-25 NOTE — PROGRESS NOTES
Otolaryngology-- Head and Neck Surgery Follow up visit      CC: Bell's palsy      Time interval of problem since last visit:  1 month    Pertinent interval elements of the history:  He has completed his 2 week course of prednisone  Subsequently his left facial paralysis has almost completely resolved  He still notes some drooping of the corner of his left lip but no trouble with chewing or swallowing  Left V3 facial numbness has also improved  He currently has no other complaints  Review of any relevant imaging:      Interval Review of systems: Pertinent review of systems documented in the HPI      Interval Social History:  Social History     Socioeconomic History    Marital status:      Spouse name: Not on file    Number of children: Not on file    Years of education: Not on file    Highest education level: Not on file   Occupational History    Occupation: reitred   Social Needs    Financial resource strain: Not on file    Food insecurity     Worry: Not on file     Inability: Not on file   [employer] needs     Medical: Not on file     Non-medical: Not on file   Tobacco Use    Smoking status: Current Every Day Smoker     Packs/day: 1 00     Years: 35 00     Pack years: 35 00     Types: Cigarettes    Smokeless tobacco: Never Used   Substance and Sexual Activity    Alcohol use: Never     Frequency: Never    Drug use: Never    Sexual activity: Not Currently   Lifestyle    Physical activity     Days per week: Not on file     Minutes per session: Not on file    Stress: Not on file   Relationships    Social connections     Talks on phone: Not on file     Gets together: Not on file     Attends Hindu service: Not on file     Active member of club or organization: Not on file     Attends meetings of clubs or organizations: Not on file     Relationship status: Not on file    Intimate partner violence     Fear of current or ex partner: Not on file     Emotionally abused: Not on file Physically abused: Not on file     Forced sexual activity: Not on file   Other Topics Concern    Not on file   Social History Narrative    Not on file       Interval Physical Examination:  Temp 98 3 °F (36 8 °C) (Temporal)   Ht 5' 7" (1 702 m)   Wt 91 6 kg (202 lb)   BMI 31 64 kg/m²   NAD  Facial nerve function: HB 1/6 bilaterally    Procedure:      Assessment:  1  Bell's palsy         Plan:  1  Resolved facial paralysis and facial numbness from Bell's palsy  He needs no additional therapy  Follow-up as needed

## 2025-08-19 ENCOUNTER — APPOINTMENT (OUTPATIENT)
Dept: RADIOLOGY | Facility: CLINIC | Age: 70
End: 2025-08-19
Attending: NURSE PRACTITIONER
Payer: COMMERCIAL

## 2025-08-19 ENCOUNTER — OFFICE VISIT (OUTPATIENT)
Dept: FAMILY MEDICINE CLINIC | Facility: CLINIC | Age: 70
End: 2025-08-19
Payer: COMMERCIAL

## 2025-08-19 VITALS
HEART RATE: 85 BPM | SYSTOLIC BLOOD PRESSURE: 134 MMHG | OXYGEN SATURATION: 97 % | RESPIRATION RATE: 18 BRPM | DIASTOLIC BLOOD PRESSURE: 76 MMHG | HEIGHT: 67 IN | BODY MASS INDEX: 30.29 KG/M2 | WEIGHT: 193 LBS | TEMPERATURE: 97.6 F

## 2025-08-19 DIAGNOSIS — Z72.0 TOBACCO ABUSE: ICD-10-CM

## 2025-08-19 DIAGNOSIS — R06.02 SOB (SHORTNESS OF BREATH): ICD-10-CM

## 2025-08-19 DIAGNOSIS — Z13.220 SCREENING FOR LIPID DISORDERS: ICD-10-CM

## 2025-08-19 DIAGNOSIS — Z12.11 SCREENING FOR MALIGNANT NEOPLASM OF COLON: ICD-10-CM

## 2025-08-19 DIAGNOSIS — R05.3 CHRONIC COUGH: ICD-10-CM

## 2025-08-19 DIAGNOSIS — K59.00 CONSTIPATION, UNSPECIFIED CONSTIPATION TYPE: ICD-10-CM

## 2025-08-19 DIAGNOSIS — Z13.1 SCREENING FOR DIABETES MELLITUS (DM): ICD-10-CM

## 2025-08-19 DIAGNOSIS — F32.1 MODERATE MAJOR DEPRESSION, SINGLE EPISODE (HCC): Primary | ICD-10-CM

## 2025-08-19 DIAGNOSIS — Z11.59 NEED FOR HEPATITIS C SCREENING TEST: ICD-10-CM

## 2025-08-19 DIAGNOSIS — Z12.5 SCREENING FOR PROSTATE CANCER: ICD-10-CM

## 2025-08-19 DIAGNOSIS — R53.83 OTHER FATIGUE: ICD-10-CM

## 2025-08-19 DIAGNOSIS — H01.9 DERMATITIS OF EYELIDS OF BOTH EYES, UNSPECIFIED TYPE: ICD-10-CM

## 2025-08-19 PROCEDURE — 99204 OFFICE O/P NEW MOD 45 MIN: CPT | Performed by: NURSE PRACTITIONER

## 2025-08-19 PROCEDURE — 71046 X-RAY EXAM CHEST 2 VIEWS: CPT

## 2025-08-19 RX ORDER — BENZOCAINE/MENTHOL 6 MG-10 MG
LOZENGE MUCOUS MEMBRANE 2 TIMES DAILY
Qty: 15 G | Refills: 0 | Status: SHIPPED | OUTPATIENT
Start: 2025-08-19 | End: 2025-08-26

## 2025-08-20 ENCOUNTER — TELEPHONE (OUTPATIENT)
Age: 70
End: 2025-08-20

## 2025-08-21 LAB
ALBUMIN SERPL-MCNC: 4.7 G/DL (ref 3.9–4.9)
ALP SERPL-CCNC: 40 IU/L (ref 44–121)
ALT SERPL-CCNC: 13 IU/L (ref 0–44)
AST SERPL-CCNC: 13 IU/L (ref 0–40)
BASOPHILS # BLD AUTO: 0 X10E3/UL (ref 0–0.2)
BASOPHILS NFR BLD AUTO: 1 %
BILIRUB SERPL-MCNC: 0.5 MG/DL (ref 0–1.2)
BUN SERPL-MCNC: 10 MG/DL (ref 8–27)
BUN/CREAT SERPL: 11 (ref 10–24)
CALCIUM SERPL-MCNC: 9.7 MG/DL (ref 8.6–10.2)
CHLORIDE SERPL-SCNC: 102 MMOL/L (ref 96–106)
CHOLEST SERPL-MCNC: 174 MG/DL (ref 100–199)
CHOLEST/HDLC SERPL: 5.8 RATIO (ref 0–5)
CO2 SERPL-SCNC: 22 MMOL/L (ref 20–29)
CREAT SERPL-MCNC: 0.89 MG/DL (ref 0.76–1.27)
EGFR: 93 ML/MIN/1.73
EOSINOPHIL # BLD AUTO: 0.1 X10E3/UL (ref 0–0.4)
EOSINOPHIL NFR BLD AUTO: 1 %
ERYTHROCYTE [DISTWIDTH] IN BLOOD BY AUTOMATED COUNT: 13.6 % (ref 11.6–15.4)
EST. AVERAGE GLUCOSE BLD GHB EST-MCNC: 126 MG/DL
GLOBULIN SER-MCNC: 2.3 G/DL (ref 1.5–4.5)
GLUCOSE SERPL-MCNC: 147 MG/DL (ref 70–99)
HBA1C MFR BLD: 6 % (ref 4.8–5.6)
HCT VFR BLD AUTO: 43.9 % (ref 37.5–51)
HCV AB S/CO SERPL IA: NON REACTIVE
HDLC SERPL-MCNC: 30 MG/DL
HGB BLD-MCNC: 15.2 G/DL (ref 13–17.7)
IMM GRANULOCYTES # BLD: 0 X10E3/UL (ref 0–0.1)
IMM GRANULOCYTES NFR BLD: 0 %
LDLC SERPL CALC-MCNC: 116 MG/DL (ref 0–99)
LYMPHOCYTES # BLD AUTO: 1.3 X10E3/UL (ref 0.7–3.1)
LYMPHOCYTES NFR BLD AUTO: 22 %
MCH RBC QN AUTO: 32.8 PG (ref 26.6–33)
MCHC RBC AUTO-ENTMCNC: 34.6 G/DL (ref 31.5–35.7)
MCV RBC AUTO: 95 FL (ref 79–97)
MONOCYTES # BLD AUTO: 0.6 X10E3/UL (ref 0.1–0.9)
MONOCYTES NFR BLD AUTO: 10 %
NEUTROPHILS # BLD AUTO: 3.8 X10E3/UL (ref 1.4–7)
NEUTROPHILS NFR BLD AUTO: 65 %
PLATELET # BLD AUTO: 175 X10E3/UL (ref 150–450)
POTASSIUM SERPL-SCNC: 4.3 MMOL/L (ref 3.5–5.2)
PROT SERPL-MCNC: 7 G/DL (ref 6–8.5)
PSA FREE MFR SERPL: 27.4 %
PSA FREE SERPL-MCNC: 0.63 NG/ML
PSA SERPL-MCNC: 2.3 NG/ML (ref 0–4)
RBC # BLD AUTO: 4.63 X10E6/UL (ref 4.14–5.8)
SL AMB VLDL CHOLESTEROL CALC: 28 MG/DL (ref 5–40)
SODIUM SERPL-SCNC: 141 MMOL/L (ref 134–144)
TRIGL SERPL-MCNC: 159 MG/DL (ref 0–149)
TSH SERPL DL<=0.005 MIU/L-ACNC: 0.66 UIU/ML (ref 0.45–4.5)
WBC # BLD AUTO: 5.7 X10E3/UL (ref 3.4–10.8)

## 2025-08-26 PROBLEM — F32.1 MODERATE MAJOR DEPRESSION, SINGLE EPISODE (HCC): Status: ACTIVE | Noted: 2025-08-26

## (undated) DEVICE — SPONGE PVP SCRUB WING STERILE

## (undated) DEVICE — BANDAGE, ESMARK LF STR 4"X9'(20/CS): Brand: CYPRESS

## (undated) DEVICE — INTENDED FOR TISSUE SEPARATION, AND OTHER PROCEDURES THAT REQUIRE A SHARP SURGICAL BLADE TO PUNCTURE OR CUT.: Brand: BARD-PARKER SAFETY BLADES SIZE 15, STERILE

## (undated) DEVICE — STRETCH BANDAGE: Brand: CURITY

## (undated) DEVICE — SPONGE GAUZE 4 X 9

## (undated) DEVICE — SPONGE SCRUB 4 PCT CHLORHEXIDINE

## (undated) DEVICE — COBAN 2 IN UNSTERILE

## (undated) DEVICE — 3M™ STERI-DRAPE™ U-DRAPE 1015: Brand: STERI-DRAPE™

## (undated) DEVICE — CURITY NON-ADHERENT STRIPS: Brand: CURITY

## (undated) DEVICE — CUFF TOURNIQUET 18 X 4 IN QUICK CONNECT DISP 1 BLADDER

## (undated) DEVICE — BLADE BEAVER 6900

## (undated) DEVICE — GLOVE SRG BIOGEL 7.5

## (undated) DEVICE — PACK GENERAL LF

## (undated) DEVICE — STOCKINETTE REGULAR

## (undated) DEVICE — GLOVE INDICATOR PI UNDERGLOVE SZ 7.5 BLUE

## (undated) DEVICE — LABEL MEDICATION STERILE 2 YELLOW LIDOCAINE 2 BLUE MARCAINE 2 ORANGE HEPARIN